# Patient Record
Sex: FEMALE | Race: WHITE | Employment: FULL TIME | URBAN - METROPOLITAN AREA
[De-identification: names, ages, dates, MRNs, and addresses within clinical notes are randomized per-mention and may not be internally consistent; named-entity substitution may affect disease eponyms.]

---

## 2017-06-20 ENCOUNTER — HOSPITAL ENCOUNTER (OUTPATIENT)
Dept: GENERAL RADIOLOGY | Age: 37
Discharge: HOME | End: 2017-06-20
Attending: INTERNAL MEDICINE
Payer: COMMERCIAL

## 2017-06-20 ENCOUNTER — OFFICE VISIT (OUTPATIENT)
Dept: INTERNAL MEDICINE CLINIC | Facility: CLINIC | Age: 37
End: 2017-06-20

## 2017-06-20 VITALS
WEIGHT: 135 LBS | BODY MASS INDEX: 22 KG/M2 | HEART RATE: 64 BPM | DIASTOLIC BLOOD PRESSURE: 65 MMHG | SYSTOLIC BLOOD PRESSURE: 116 MMHG

## 2017-06-20 DIAGNOSIS — R53.83 OTHER FATIGUE: Primary | ICD-10-CM

## 2017-06-20 DIAGNOSIS — E78.5 HYPERLIPIDEMIA, UNSPECIFIED HYPERLIPIDEMIA TYPE: ICD-10-CM

## 2017-06-20 DIAGNOSIS — R53.83 OTHER FATIGUE: ICD-10-CM

## 2017-06-20 DIAGNOSIS — R23.3 SPONTANEOUS ECCHYMOSES: ICD-10-CM

## 2017-06-20 PROCEDURE — 71020 XR CHEST PA + LAT CHEST (CPT=71020): CPT | Performed by: INTERNAL MEDICINE

## 2017-06-20 PROCEDURE — 99214 OFFICE O/P EST MOD 30 MIN: CPT | Performed by: INTERNAL MEDICINE

## 2017-06-20 PROCEDURE — 99212 OFFICE O/P EST SF 10 MIN: CPT | Performed by: INTERNAL MEDICINE

## 2017-06-20 RX ORDER — GARLIC EXTRACT 500 MG
1 CAPSULE ORAL DAILY
COMMUNITY
End: 2019-03-04

## 2017-06-20 NOTE — PROGRESS NOTES
HPI:    Patient ID: Nelsy Sandy is a 44year old female.     HPI  Patient patient states that she feels fatigued, he has to push herself to do regular activities, she does have busy life, works 3 jobs, his children, she is single mother,  Constantly o Allergies:  Dust                        Comment:Eyes swell   /65   Pulse 64   Wt 135 lb (61.2 kg)   BMI 21.79 kg/m²     Physical Exam    Constitutional: She is oriented to person, place, and time. She appears well-developed and well-nourished.  No [E]  Prothrombin Time (PT) [E]  PTT, Activated [E]    Meds This Visit:  No prescriptions requested or ordered in this encounter       Imaging & Referrals:  CT ABDOMEN + PELVIS(CONTRAST ONLY) (JE[facility]=[id number])         #[id number]

## 2017-06-30 ENCOUNTER — HOSPITAL ENCOUNTER (OUTPATIENT)
Dept: CT IMAGING | Age: 37
Discharge: HOME | End: 2017-06-30
Attending: INTERNAL MEDICINE
Payer: COMMERCIAL

## 2017-06-30 ENCOUNTER — LAB ENCOUNTER (OUTPATIENT)
Dept: LAB | Age: 37
End: 2017-06-30
Attending: INTERNAL MEDICINE
Payer: COMMERCIAL

## 2017-06-30 DIAGNOSIS — R53.83 OTHER FATIGUE: ICD-10-CM

## 2017-06-30 DIAGNOSIS — R23.3 SPONTANEOUS ECCHYMOSES: ICD-10-CM

## 2017-06-30 LAB
ALBUMIN SERPL BCP-MCNC: 3.8 G/DL (ref 3.5–4.8)
ALBUMIN/GLOB SERPL: 1.5 {RATIO} (ref 1–2)
ALP SERPL-CCNC: 60 U/L (ref 32–100)
ALT SERPL-CCNC: 15 U/L (ref 14–54)
ANION GAP SERPL CALC-SCNC: 7 MMOL/L (ref 0–18)
APTT PPP: 31.2 SECONDS (ref 23.2–35.3)
AST SERPL-CCNC: 22 U/L (ref 15–41)
BASOPHILS # BLD: 0 K/UL (ref 0–0.2)
BASOPHILS NFR BLD: 0 %
BILIRUB SERPL-MCNC: 0.8 MG/DL (ref 0.3–1.2)
BUN SERPL-MCNC: 5 MG/DL (ref 8–20)
BUN/CREAT SERPL: 6.8 (ref 10–20)
CALCIUM SERPL-MCNC: 9.4 MG/DL (ref 8.5–10.5)
CHLORIDE SERPL-SCNC: 106 MMOL/L (ref 95–110)
CHOLEST SERPL-MCNC: 228 MG/DL (ref 110–200)
CO2 SERPL-SCNC: 25 MMOL/L (ref 22–32)
CREAT BLD-MCNC: 0.6 MG/DL (ref 0.5–1.5)
CREAT SERPL-MCNC: 0.73 MG/DL (ref 0.5–1.5)
CRP SERPL-MCNC: <0.5 MG/DL (ref 0–0.9)
EOSINOPHIL # BLD: 0.1 K/UL (ref 0–0.7)
EOSINOPHIL NFR BLD: 1 %
ERYTHROCYTE [DISTWIDTH] IN BLOOD BY AUTOMATED COUNT: 13.7 % (ref 11–15)
ERYTHROCYTE [SEDIMENTATION RATE] IN BLOOD: 6 MM/HR (ref 0–20)
GLOBULIN PLAS-MCNC: 2.5 G/DL (ref 2.5–3.7)
GLUCOSE SERPL-MCNC: 86 MG/DL (ref 70–99)
HCT VFR BLD AUTO: 39.4 % (ref 35–48)
HDLC SERPL-MCNC: 75 MG/DL
HGB BLD-MCNC: 13.1 G/DL (ref 12–16)
INR BLD: 1 (ref 0.9–1.2)
LDLC SERPL CALC-MCNC: 135 MG/DL (ref 0–99)
LYMPHOCYTES # BLD: 1.9 K/UL (ref 1–4)
LYMPHOCYTES NFR BLD: 32 %
MAGNESIUM SERPL-MCNC: 1.8 MG/DL (ref 1.8–2.5)
MCH RBC QN AUTO: 28.7 PG (ref 27–32)
MCHC RBC AUTO-ENTMCNC: 33.3 G/DL (ref 32–37)
MCV RBC AUTO: 86.2 FL (ref 80–100)
MONOCYTES # BLD: 0.4 K/UL (ref 0–1)
MONOCYTES NFR BLD: 7 %
NEUTROPHILS # BLD AUTO: 3.5 K/UL (ref 1.8–7.7)
NEUTROPHILS NFR BLD: 60 %
NONHDLC SERPL-MCNC: 153 MG/DL
OSMOLALITY UR CALC.SUM OF ELEC: 283 MOSM/KG (ref 275–295)
PLATELET # BLD AUTO: 234 K/UL (ref 140–400)
PMV BLD AUTO: 9.2 FL (ref 7.4–10.3)
POTASSIUM SERPL-SCNC: 4.5 MMOL/L (ref 3.3–5.1)
PROT SERPL-MCNC: 6.3 G/DL (ref 5.9–8.4)
PROTHROMBIN TIME: 12.5 SECONDS (ref 11.8–14.5)
RBC # BLD AUTO: 4.57 M/UL (ref 3.7–5.4)
SODIUM SERPL-SCNC: 138 MMOL/L (ref 136–144)
TRIGL SERPL-MCNC: 90 MG/DL (ref 1–149)
TSH SERPL-ACNC: 1.05 UIU/ML (ref 0.45–5.33)
WBC # BLD AUTO: 5.9 K/UL (ref 4–11)

## 2017-06-30 PROCEDURE — 85610 PROTHROMBIN TIME: CPT

## 2017-06-30 PROCEDURE — 83735 ASSAY OF MAGNESIUM: CPT

## 2017-06-30 PROCEDURE — 80053 COMPREHEN METABOLIC PANEL: CPT

## 2017-06-30 PROCEDURE — 85730 THROMBOPLASTIN TIME PARTIAL: CPT

## 2017-06-30 PROCEDURE — 82306 VITAMIN D 25 HYDROXY: CPT

## 2017-06-30 PROCEDURE — 86140 C-REACTIVE PROTEIN: CPT

## 2017-06-30 PROCEDURE — 80061 LIPID PANEL: CPT

## 2017-06-30 PROCEDURE — 84443 ASSAY THYROID STIM HORMONE: CPT

## 2017-06-30 PROCEDURE — 85025 COMPLETE CBC W/AUTO DIFF WBC: CPT

## 2017-06-30 PROCEDURE — 85652 RBC SED RATE AUTOMATED: CPT

## 2017-06-30 PROCEDURE — 82565 ASSAY OF CREATININE: CPT

## 2017-06-30 PROCEDURE — 36415 COLL VENOUS BLD VENIPUNCTURE: CPT

## 2017-06-30 PROCEDURE — 74177 CT ABD & PELVIS W/CONTRAST: CPT | Performed by: INTERNAL MEDICINE

## 2017-07-03 LAB — 25(OH)D3 SERPL-MCNC: 39.6 NG/ML

## 2017-07-07 NOTE — ADDENDUM NOTE
Encounter addended by:  Mack Augustin LPN on: 9/0/1409  4:84 AM<BR>    Actions taken: Letter status changed

## 2017-10-02 ENCOUNTER — HOSPITAL ENCOUNTER (OUTPATIENT)
Age: 37
Discharge: HOME | End: 2017-10-02
Payer: COMMERCIAL

## 2017-10-02 VITALS
OXYGEN SATURATION: 100 % | TEMPERATURE: 99 F | DIASTOLIC BLOOD PRESSURE: 64 MMHG | RESPIRATION RATE: 16 BRPM | WEIGHT: 135 LBS | SYSTOLIC BLOOD PRESSURE: 106 MMHG | HEIGHT: 66 IN | HEART RATE: 81 BPM | BODY MASS INDEX: 21.69 KG/M2

## 2017-10-02 DIAGNOSIS — J02.9 VIRAL PHARYNGITIS: Primary | ICD-10-CM

## 2017-10-02 PROCEDURE — 99204 OFFICE O/P NEW MOD 45 MIN: CPT

## 2017-10-02 PROCEDURE — 87430 STREP A AG IA: CPT

## 2017-10-02 PROCEDURE — 99213 OFFICE O/P EST LOW 20 MIN: CPT

## 2017-10-02 RX ORDER — AMOXICILLIN 500 MG/1
500 TABLET, FILM COATED ORAL 2 TIMES DAILY
Qty: 20 TABLET | Refills: 0 | Status: SHIPPED | OUTPATIENT
Start: 2017-10-02 | End: 2017-10-12

## 2017-10-03 NOTE — ED INITIAL ASSESSMENT (HPI)
Pt with pain in right ear, swollen area on right side of neck and sore throat. Pt states she thinks she was bit by a spider. States pain started on Friday.  Denies fever

## 2017-10-03 NOTE — ED PROVIDER NOTES
Patient presents with:  Ear Pain  Sore Throat      HPI:     Tyrone Parmar is a 44year old female who presents for evaluation of a chief complaint of sore throat. Patient reports she was bit by a spider to the right side of her neck 2 days ago.   Today throat for the last 2 days. Patient reports at that time she also believes she was bit by a spider to the right side of her neck. There is a pop small pea-sized area of redness to the right side of her neck consistent with a bite.   There is no streaking,

## 2017-10-07 ENCOUNTER — HOSPITAL ENCOUNTER (EMERGENCY)
Facility: HOSPITAL | Age: 37
Discharge: HOME | End: 2017-10-07
Attending: EMERGENCY MEDICINE
Payer: COMMERCIAL

## 2017-10-07 VITALS
HEIGHT: 66 IN | BODY MASS INDEX: 21.69 KG/M2 | OXYGEN SATURATION: 100 % | DIASTOLIC BLOOD PRESSURE: 81 MMHG | SYSTOLIC BLOOD PRESSURE: 125 MMHG | RESPIRATION RATE: 16 BRPM | HEART RATE: 65 BPM | TEMPERATURE: 98 F | WEIGHT: 135 LBS

## 2017-10-07 DIAGNOSIS — N93.8 DYSFUNCTIONAL UTERINE BLEEDING: Primary | ICD-10-CM

## 2017-10-07 PROCEDURE — 80048 BASIC METABOLIC PNL TOTAL CA: CPT | Performed by: EMERGENCY MEDICINE

## 2017-10-07 PROCEDURE — 81001 URINALYSIS AUTO W/SCOPE: CPT | Performed by: EMERGENCY MEDICINE

## 2017-10-07 PROCEDURE — 85025 COMPLETE CBC W/AUTO DIFF WBC: CPT | Performed by: EMERGENCY MEDICINE

## 2017-10-07 PROCEDURE — 36415 COLL VENOUS BLD VENIPUNCTURE: CPT

## 2017-10-07 PROCEDURE — 99283 EMERGENCY DEPT VISIT LOW MDM: CPT

## 2017-10-07 RX ORDER — TRAMADOL HYDROCHLORIDE 50 MG/1
50 TABLET ORAL ONCE
Status: COMPLETED | OUTPATIENT
Start: 2017-10-07 | End: 2017-10-07

## 2017-10-07 RX ORDER — IBUPROFEN 600 MG/1
600 TABLET ORAL ONCE
Status: COMPLETED | OUTPATIENT
Start: 2017-10-07 | End: 2017-10-07

## 2017-10-07 NOTE — ED INITIAL ASSESSMENT (HPI)
C/o heavy vaginal bleeding with clots since yesterday. Unknown if pregnant d/t irregular periods. Pt c/o lower abd pain, mostly on the left. Pt denies fever, c/o n/v x 2 this morning. Pt also being treated for Strep throat with amox, started on Monday.  Pt

## 2017-10-07 NOTE — ED PROVIDER NOTES
Patient Seen in: [facility] AND [facility] Emergency Department    History   Patient presents with:  Vaginal Bleeding    Stated Complaint: bleeding    HPI    Patient complains of vaginal bleeding that began yesterday. Hx of irregular periods.   Today crampy lowe Oral  SpO2: 99 %  O2 Device: None (Room air)    Current:/81   Pulse 65   Temp 97.8 °F (36.6 °C) (Oral)   Resp 16   Ht 167.6 cm (5' 6\")   Wt 61.2 kg   LMP 09/07/2017   SpO2 100%   BMI 21.79 kg/m²     PULSE OX Nl on room air    GENERAL: awake alert no

## 2018-01-01 ENCOUNTER — HOSPITAL ENCOUNTER (EMERGENCY)
Facility: HOSPITAL | Age: 38
Discharge: HOME | End: 2018-01-02
Attending: EMERGENCY MEDICINE
Payer: COMMERCIAL

## 2018-01-01 DIAGNOSIS — R07.9 CHEST PAIN OF UNCERTAIN ETIOLOGY: Primary | ICD-10-CM

## 2018-01-01 PROCEDURE — 36415 COLL VENOUS BLD VENIPUNCTURE: CPT

## 2018-01-01 PROCEDURE — 93005 ELECTROCARDIOGRAM TRACING: CPT

## 2018-01-01 PROCEDURE — 93010 ELECTROCARDIOGRAM REPORT: CPT | Performed by: EMERGENCY MEDICINE

## 2018-01-01 PROCEDURE — 99285 EMERGENCY DEPT VISIT HI MDM: CPT

## 2018-01-01 RX ORDER — MAGNESIUM HYDROXIDE/ALUMINUM HYDROXICE/SIMETHICONE 120; 1200; 1200 MG/30ML; MG/30ML; MG/30ML
30 SUSPENSION ORAL ONCE
Status: COMPLETED | OUTPATIENT
Start: 2018-01-01 | End: 2018-01-02

## 2018-01-01 RX ORDER — ASPIRIN 81 MG/1
324 TABLET, CHEWABLE ORAL ONCE
Status: COMPLETED | OUTPATIENT
Start: 2018-01-01 | End: 2018-01-02

## 2018-01-01 RX ORDER — DICYCLOMINE HYDROCHLORIDE 10 MG/5ML
20 SOLUTION ORAL ONCE
Status: COMPLETED | OUTPATIENT
Start: 2018-01-01 | End: 2018-01-02

## 2018-01-02 ENCOUNTER — APPOINTMENT (OUTPATIENT)
Dept: GENERAL RADIOLOGY | Facility: HOSPITAL | Age: 38
End: 2018-01-02
Attending: EMERGENCY MEDICINE
Payer: COMMERCIAL

## 2018-01-02 VITALS
HEIGHT: 66 IN | WEIGHT: 135 LBS | BODY MASS INDEX: 21.69 KG/M2 | OXYGEN SATURATION: 100 % | SYSTOLIC BLOOD PRESSURE: 113 MMHG | HEART RATE: 52 BPM | DIASTOLIC BLOOD PRESSURE: 68 MMHG | RESPIRATION RATE: 16 BRPM

## 2018-01-02 LAB
ANION GAP SERPL CALC-SCNC: 9 MMOL/L (ref 0–18)
BASOPHILS # BLD: 0 K/UL (ref 0–0.2)
BASOPHILS NFR BLD: 1 %
BUN SERPL-MCNC: 5 MG/DL (ref 8–20)
BUN/CREAT SERPL: 6.9 (ref 10–20)
CALCIUM SERPL-MCNC: 9.6 MG/DL (ref 8.5–10.5)
CHLORIDE SERPL-SCNC: 103 MMOL/L (ref 95–110)
CO2 SERPL-SCNC: 25 MMOL/L (ref 22–32)
CREAT SERPL-MCNC: 0.72 MG/DL (ref 0.5–1.5)
EOSINOPHIL # BLD: 0.1 K/UL (ref 0–0.7)
EOSINOPHIL NFR BLD: 1 %
ERYTHROCYTE [DISTWIDTH] IN BLOOD BY AUTOMATED COUNT: 14.5 % (ref 11–15)
GLUCOSE SERPL-MCNC: 86 MG/DL (ref 70–99)
HCT VFR BLD AUTO: 43.1 % (ref 35–48)
HGB BLD-MCNC: 14.3 G/DL (ref 12–16)
LYMPHOCYTES # BLD: 2.5 K/UL (ref 1–4)
LYMPHOCYTES NFR BLD: 33 %
MCH RBC QN AUTO: 28.3 PG (ref 27–32)
MCHC RBC AUTO-ENTMCNC: 33.2 G/DL (ref 32–37)
MCV RBC AUTO: 85.1 FL (ref 80–100)
MONOCYTES # BLD: 0.6 K/UL (ref 0–1)
MONOCYTES NFR BLD: 7 %
NEUTROPHILS # BLD AUTO: 4.3 K/UL (ref 1.8–7.7)
NEUTROPHILS NFR BLD: 57 %
OSMOLALITY UR CALC.SUM OF ELEC: 281 MOSM/KG (ref 275–295)
PLATELET # BLD AUTO: 331 K/UL (ref 140–400)
PMV BLD AUTO: 8.7 FL (ref 7.4–10.3)
POTASSIUM SERPL-SCNC: 3.9 MMOL/L (ref 3.3–5.1)
RBC # BLD AUTO: 5.06 M/UL (ref 3.7–5.4)
SODIUM SERPL-SCNC: 137 MMOL/L (ref 136–144)
TROPONIN I SERPL-MCNC: 0.01 NG/ML (ref ?–0.03)
WBC # BLD AUTO: 7.5 K/UL (ref 4–11)

## 2018-01-02 PROCEDURE — 85025 COMPLETE CBC W/AUTO DIFF WBC: CPT | Performed by: EMERGENCY MEDICINE

## 2018-01-02 PROCEDURE — 80048 BASIC METABOLIC PNL TOTAL CA: CPT | Performed by: EMERGENCY MEDICINE

## 2018-01-02 PROCEDURE — 84484 ASSAY OF TROPONIN QUANT: CPT | Performed by: EMERGENCY MEDICINE

## 2018-01-02 PROCEDURE — 71046 X-RAY EXAM CHEST 2 VIEWS: CPT | Performed by: EMERGENCY MEDICINE

## 2018-01-02 NOTE — ED NOTES
Patient arrives with complaints of chest pain that radiates to left shoulder that \"woke her up this morning\". Patient states she vomited twice, and has been very nauseous. Denies SOB. Denies blurred vision or dizziness.  Per pt, has family history of hear

## 2018-01-02 NOTE — ED PROVIDER NOTES
Patient Seen in: [facility] AND [facility] Emergency Department    History   Patient presents with:  Chest Pain Angina (cardiovascular)    Stated Complaint: chest pain     HPI    45 yo female with chest pain that started this morning. She awoke with the pain.  It light.   Neck: Normal range of motion. Neck supple. Cardiovascular: Normal rate, regular rhythm, normal heart sounds and intact distal pulses. Pulmonary/Chest: Effort normal and breath sounds normal.   Abdominal: Soft.  Bowel sounds are normal. She exh unremarkable. The lungs are clear. No evidence for pneumonia or pulmonary edema. No pleural effusion or pneumothorax. Osseous structures do not demonstrate any displaced fractures.   Surgical clips in the right upper quadrant        Labs reviewed an

## 2018-02-12 ENCOUNTER — HOSPITAL ENCOUNTER (OUTPATIENT)
Age: 38
Discharge: HOME | End: 2018-02-12
Payer: COMMERCIAL

## 2018-02-12 VITALS
RESPIRATION RATE: 18 BRPM | WEIGHT: 135 LBS | TEMPERATURE: 98 F | DIASTOLIC BLOOD PRESSURE: 64 MMHG | OXYGEN SATURATION: 100 % | SYSTOLIC BLOOD PRESSURE: 109 MMHG | HEIGHT: 66 IN | HEART RATE: 67 BPM | BODY MASS INDEX: 21.69 KG/M2

## 2018-02-12 DIAGNOSIS — J02.0 STREPTOCOCCAL SORE THROAT: Primary | ICD-10-CM

## 2018-02-12 LAB
B-HCG UR QL: NEGATIVE
S PYO AG THROAT QL: POSITIVE
URINE BILIRUBIN: NEGATIVE
URINE BLOOD: NEGATIVE
URINE CLARITY: CLEAR
URINE COLOR: YELLOW
URINE GLUCOSE: NEGATIVE MG/DL
URINE KETONES: NEGATIVE MG/DL
URINE LEUKOCYTE ESTERASE: NEGATIVE
URINE NITRITE: NEGATIVE
URINE PH: 7
URINE PROTEIN: NEGATIVE MG /DL
URINE SPECIFIC GRAVITY: 1.01
URINE UROBILINOGEN: 0.2 MG/DL

## 2018-02-12 PROCEDURE — 99214 OFFICE O/P EST MOD 30 MIN: CPT

## 2018-02-12 PROCEDURE — 99213 OFFICE O/P EST LOW 20 MIN: CPT

## 2018-02-12 PROCEDURE — 81025 URINE PREGNANCY TEST: CPT

## 2018-02-12 PROCEDURE — 87430 STREP A AG IA: CPT

## 2018-02-12 PROCEDURE — 81002 URINALYSIS NONAUTO W/O SCOPE: CPT

## 2018-02-12 RX ORDER — FLUCONAZOLE 150 MG/1
150 TABLET ORAL ONCE
Qty: 1 TABLET | Refills: 0 | Status: SHIPPED | OUTPATIENT
Start: 2018-02-12 | End: 2018-02-12

## 2018-02-12 RX ORDER — AMOXICILLIN 875 MG/1
875 TABLET, COATED ORAL 2 TIMES DAILY
Qty: 20 TABLET | Refills: 0 | Status: SHIPPED | OUTPATIENT
Start: 2018-02-12 | End: 2018-02-22

## 2018-02-13 NOTE — ED INITIAL ASSESSMENT (HPI)
PATIENT ARRIVED AMBULATORY TO ROOM WITH FAMILY C/O A CONGESTED COUGH AND NASAL CONGESTION STARTED YESTERDAY. +BODY ACHES. +FATIGUE. NO N/V/D. PT DENIES TAKING TEMPERATURES. EASY NON LABORED RESPIRATIONS.

## 2018-02-13 NOTE — ED PROVIDER NOTES
Patient presents with:  Cough/URI      HPI:     Ella Salinas is a 44year old female who presents for evaluation of a chief complaint of sore throat, right-sided back discomfort, sneezing, and some nasal congestion that started last night.   No history turgor, no obvious rashes  NECK: supple, no adenopathy, no thyromegaly. No meningismus. CARDIO: RRR without murmur  EXTREMITIES: no cyanosis, clubbing or edema  GI: soft, non-tender, normal bowel sounds. No distention. No masses palpated.  Hx of cholecyste 1. 030   Blood, Urine Negative Negative   PH, Urine 7.0 5.0 - 8.0   Protein urine Negative Negative mg /dL   Urobilinogen urine 0.2 <2.0 mg/dL   Nitrite Urine Negative Negative   Leukocyte esterase urine Negative Negative       MDM:  The urine dip and urine

## 2018-06-01 ENCOUNTER — HOSPITAL ENCOUNTER (EMERGENCY)
Facility: HOSPITAL | Age: 38
Discharge: HOME | End: 2018-06-01
Attending: PHYSICIAN ASSISTANT
Payer: COMMERCIAL

## 2018-06-01 ENCOUNTER — APPOINTMENT (OUTPATIENT)
Dept: CT IMAGING | Facility: HOSPITAL | Age: 38
End: 2018-06-01
Attending: PHYSICIAN ASSISTANT
Payer: COMMERCIAL

## 2018-06-01 VITALS
HEART RATE: 56 BPM | DIASTOLIC BLOOD PRESSURE: 64 MMHG | HEIGHT: 66 IN | OXYGEN SATURATION: 98 % | TEMPERATURE: 99 F | WEIGHT: 135 LBS | RESPIRATION RATE: 16 BRPM | SYSTOLIC BLOOD PRESSURE: 102 MMHG | BODY MASS INDEX: 21.69 KG/M2

## 2018-06-01 DIAGNOSIS — K52.9 ENTERITIS: ICD-10-CM

## 2018-06-01 DIAGNOSIS — R10.10 UPPER ABDOMINAL PAIN: Primary | ICD-10-CM

## 2018-06-01 PROCEDURE — 85025 COMPLETE CBC W/AUTO DIFF WBC: CPT | Performed by: PHYSICIAN ASSISTANT

## 2018-06-01 PROCEDURE — 96374 THER/PROPH/DIAG INJ IV PUSH: CPT

## 2018-06-01 PROCEDURE — 81001 URINALYSIS AUTO W/SCOPE: CPT | Performed by: PHYSICIAN ASSISTANT

## 2018-06-01 PROCEDURE — 80053 COMPREHEN METABOLIC PANEL: CPT | Performed by: PHYSICIAN ASSISTANT

## 2018-06-01 PROCEDURE — 83690 ASSAY OF LIPASE: CPT | Performed by: PHYSICIAN ASSISTANT

## 2018-06-01 PROCEDURE — 81025 URINE PREGNANCY TEST: CPT

## 2018-06-01 PROCEDURE — 96375 TX/PRO/DX INJ NEW DRUG ADDON: CPT

## 2018-06-01 PROCEDURE — 74176 CT ABD & PELVIS W/O CONTRAST: CPT | Performed by: PHYSICIAN ASSISTANT

## 2018-06-01 PROCEDURE — 99284 EMERGENCY DEPT VISIT MOD MDM: CPT

## 2018-06-01 RX ORDER — FAMOTIDINE 20 MG/1
20 TABLET ORAL 2 TIMES DAILY
Qty: 28 TABLET | Refills: 0 | Status: SHIPPED | OUTPATIENT
Start: 2018-06-01 | End: 2018-06-15

## 2018-06-01 RX ORDER — MORPHINE SULFATE 4 MG/ML
4 INJECTION, SOLUTION INTRAMUSCULAR; INTRAVENOUS ONCE
Status: COMPLETED | OUTPATIENT
Start: 2018-06-01 | End: 2018-06-01

## 2018-06-01 RX ORDER — ONDANSETRON 2 MG/ML
4 INJECTION INTRAMUSCULAR; INTRAVENOUS ONCE
Status: COMPLETED | OUTPATIENT
Start: 2018-06-01 | End: 2018-06-01

## 2018-06-01 RX ORDER — ONDANSETRON 4 MG/1
4 TABLET, ORALLY DISINTEGRATING ORAL EVERY 6 HOURS PRN
Qty: 10 TABLET | Refills: 0 | Status: SHIPPED | OUTPATIENT
Start: 2018-06-01 | End: 2018-06-04

## 2018-06-01 NOTE — ED INITIAL ASSESSMENT (HPI)
Pt came in for mid back pain bilat that radiates to front of her abdomen for one day with nausea and diarrhea. Reports similar to her gallbladder pain prior to [place] but more so on the LUQ. Denies vomiting, fever, CP/SOB, cough, dysuria/hematuria.  RR even

## 2018-06-01 NOTE — ED PROVIDER NOTES
Patient Seen in: [facility] AND [facility] Emergency Department    History   Patient presents with:  Back Pain (musculoskeletal)    Stated Complaint: Upper back & midepigastric pain since this AM    HPI    Lux Castellano is a 43year old female who presents w this AM  Other systems are as noted in HPI. Constitutional and vital signs reviewed. All other systems reviewed and negative except as noted above. PSFH elements reviewed from today and agreed except as otherwise stated in HPI.     Physical Exam inspection. Warm and dry. No obvious bruising. No obvious rash.         ED Course     Labs Reviewed   URINALYSIS WITH CULTURE REFLEX - Abnormal; Notable for the following:        Result Value    Blood Urine Small (*)     Bacteria Urine Few (*)     All ot reexaminations as previously documented. Results reviewed and need for follow-up discussed with patient. Patient case discussed with and patient seen by Dr. Xiang Olivera.     Disposition and Plan     Clinical Impression:  Upper abdominal pain  (primary encounte

## 2018-06-01 NOTE — ED NOTES
Pt states having burning epigastric pain, with mid back pain, nausea. States started approx 1pm.  feels similar to her gallblader, which was removed approx 5 yrs ago. Denies vomiting, fevers, chest pain, alcohol use.   is using several supplemen

## 2019-01-03 ENCOUNTER — OFFICE VISIT (OUTPATIENT)
Dept: INTERNAL MEDICINE CLINIC | Facility: CLINIC | Age: 39
End: 2019-01-03

## 2019-01-03 ENCOUNTER — LAB ENCOUNTER (OUTPATIENT)
Dept: LAB | Age: 39
End: 2019-01-03
Attending: INTERNAL MEDICINE
Payer: COMMERCIAL

## 2019-01-03 VITALS
HEART RATE: 72 BPM | TEMPERATURE: 99 F | DIASTOLIC BLOOD PRESSURE: 70 MMHG | BODY MASS INDEX: 23 KG/M2 | WEIGHT: 145.5 LBS | SYSTOLIC BLOOD PRESSURE: 110 MMHG

## 2019-01-03 DIAGNOSIS — R19.7 DIARRHEA, UNSPECIFIED TYPE: Primary | ICD-10-CM

## 2019-01-03 DIAGNOSIS — R19.7 DIARRHEA, UNSPECIFIED TYPE: ICD-10-CM

## 2019-01-03 LAB
ALBUMIN SERPL BCP-MCNC: 4.5 G/DL (ref 3.5–4.8)
ALBUMIN/GLOB SERPL: 1.5 {RATIO} (ref 1–2)
ALP SERPL-CCNC: 83 U/L (ref 32–100)
ALT SERPL-CCNC: 16 U/L (ref 14–54)
ANION GAP SERPL CALC-SCNC: 11 MMOL/L (ref 0–18)
AST SERPL-CCNC: 23 U/L (ref 15–41)
BASOPHILS # BLD: 0 K/UL (ref 0–0.2)
BASOPHILS NFR BLD: 1 %
BILIRUB SERPL-MCNC: 0.6 MG/DL (ref 0.3–1.2)
BUN SERPL-MCNC: 6 MG/DL (ref 8–20)
BUN/CREAT SERPL: 7.6 (ref 10–20)
CALCIUM SERPL-MCNC: 10.1 MG/DL (ref 8.5–10.5)
CHLORIDE SERPL-SCNC: 105 MMOL/L (ref 95–110)
CO2 SERPL-SCNC: 22 MMOL/L (ref 22–32)
CREAT SERPL-MCNC: 0.79 MG/DL (ref 0.5–1.5)
EOSINOPHIL # BLD: 0.1 K/UL (ref 0–0.7)
EOSINOPHIL NFR BLD: 1 %
ERYTHROCYTE [DISTWIDTH] IN BLOOD BY AUTOMATED COUNT: 14.6 % (ref 11–15)
GLOBULIN PLAS-MCNC: 3.1 G/DL (ref 2.5–3.7)
GLUCOSE SERPL-MCNC: 95 MG/DL (ref 70–99)
HCT VFR BLD AUTO: 44.9 % (ref 35–48)
HGB BLD-MCNC: 14.7 G/DL (ref 12–16)
LYMPHOCYTES # BLD: 2.1 K/UL (ref 1–4)
LYMPHOCYTES NFR BLD: 23 %
MCH RBC QN AUTO: 27.7 PG (ref 27–32)
MCHC RBC AUTO-ENTMCNC: 32.6 G/DL (ref 32–37)
MCV RBC AUTO: 84.8 FL (ref 80–100)
MONOCYTES # BLD: 0.6 K/UL (ref 0–1)
MONOCYTES NFR BLD: 7 %
NEUTROPHILS # BLD AUTO: 6.4 K/UL (ref 1.8–7.7)
NEUTROPHILS NFR BLD: 69 %
OSMOLALITY UR CALC.SUM OF ELEC: 283 MOSM/KG (ref 275–295)
PATIENT FASTING: NO
PLATELET # BLD AUTO: 243 K/UL (ref 140–400)
PMV BLD AUTO: 9.5 FL (ref 7.4–10.3)
POTASSIUM SERPL-SCNC: 5.5 MMOL/L (ref 3.3–5.1)
PROT SERPL-MCNC: 7.6 G/DL (ref 5.9–8.4)
RBC # BLD AUTO: 5.3 M/UL (ref 3.7–5.4)
SODIUM SERPL-SCNC: 138 MMOL/L (ref 136–144)
TSH SERPL-ACNC: 1.02 UIU/ML (ref 0.45–5.33)
WBC # BLD AUTO: 9.2 K/UL (ref 4–11)

## 2019-01-03 PROCEDURE — 85025 COMPLETE CBC W/AUTO DIFF WBC: CPT

## 2019-01-03 PROCEDURE — 84443 ASSAY THYROID STIM HORMONE: CPT

## 2019-01-03 PROCEDURE — 99212 OFFICE O/P EST SF 10 MIN: CPT | Performed by: INTERNAL MEDICINE

## 2019-01-03 PROCEDURE — 99214 OFFICE O/P EST MOD 30 MIN: CPT | Performed by: INTERNAL MEDICINE

## 2019-01-03 PROCEDURE — 80053 COMPREHEN METABOLIC PANEL: CPT

## 2019-01-03 PROCEDURE — 36415 COLL VENOUS BLD VENIPUNCTURE: CPT

## 2019-01-04 ENCOUNTER — LAB ENCOUNTER (OUTPATIENT)
Dept: LAB | Age: 39
End: 2019-01-04
Attending: INTERNAL MEDICINE
Payer: COMMERCIAL

## 2019-01-04 DIAGNOSIS — R19.7 DIARRHEA, UNSPECIFIED TYPE: ICD-10-CM

## 2019-01-04 LAB — C DIFF TOX B STL QL: NEGATIVE

## 2019-01-04 PROCEDURE — 87046 STOOL CULTR AEROBIC BACT EA: CPT

## 2019-01-04 PROCEDURE — 87493 C DIFF AMPLIFIED PROBE: CPT

## 2019-01-04 PROCEDURE — 87427 SHIGA-LIKE TOXIN AG IA: CPT

## 2019-01-04 PROCEDURE — 87045 FECES CULTURE AEROBIC BACT: CPT

## 2019-01-07 ENCOUNTER — TELEPHONE (OUTPATIENT)
Dept: INTERNAL MEDICINE CLINIC | Facility: CLINIC | Age: 39
End: 2019-01-07

## 2019-01-07 DIAGNOSIS — R19.7 DIARRHEA, UNSPECIFIED TYPE: Primary | ICD-10-CM

## 2019-01-07 NOTE — TELEPHONE ENCOUNTER
Phone call from patient for lab results. Related Dr. Hadley Lewis lab results message to patient that stool negative for C-Diff and labs, \"ok\". Patient states,\"ok\". Patient states she still having diarrhea stool in the AM only.  States she thinks condition is r

## 2019-01-08 NOTE — TELEPHONE ENCOUNTER
----- Message from Edith Acosta MD sent at 1/6/2019  8:07 PM CST -----  Stools all normal how ix she doing?     Notes recorded by Mukesh Mcallister MD on 1/6/2019 at 8:00 PM CST  Labs good

## 2019-01-12 NOTE — PROGRESS NOTES
HPI:    Patient ID: Shena Craig is a 43year old female. Patient presents with:  Diarrhea: c/o abdominal growling. Rash    Diarrhea    Pertinent negatives include no chills, fever or vomiting.    Pt c/o diarrhea with associated \"growling sounds\" mouth daily. Disp:  Rfl:    VITAMIN D-VITAMIN K OR Take 1 tablet by mouth daily. Disp:  Rfl:    Magnesium 500 MG Oral Tab Take  by mouth. Take 1 tablet daily Disp:  Rfl:    BIOTIN OR Take  by mouth.  Disp:  Rfl:    Multiple Vitamin (MULTI-VITAMIN) Oral Tab Lab Results   Component Value Date    TRIG 90 06/30/2017     Lab Results   Component Value Date    AST 23 01/03/2019    AST 19 06/01/2018    AST 22 06/30/2017     Lab Results   Component Value Date    ALT 16 01/03/2019    ALT 12 (L) 06/01/2018    ALT 1

## 2019-01-19 NOTE — TELEPHONE ENCOUNTER
Pt informed of lab result & MD message. Pt stated she feels the same, still has  diarrhea 2-3 x per day, sometime soft or liquid stool. .   pls advise, thanks in advance.

## 2019-02-24 ENCOUNTER — APPOINTMENT (OUTPATIENT)
Dept: ULTRASOUND IMAGING | Facility: HOSPITAL | Age: 39
End: 2019-02-24
Attending: EMERGENCY MEDICINE
Payer: COMMERCIAL

## 2019-02-24 ENCOUNTER — HOSPITAL ENCOUNTER (EMERGENCY)
Facility: HOSPITAL | Age: 39
Discharge: HOME | End: 2019-02-24
Attending: EMERGENCY MEDICINE
Payer: COMMERCIAL

## 2019-02-24 VITALS
WEIGHT: 135 LBS | OXYGEN SATURATION: 100 % | SYSTOLIC BLOOD PRESSURE: 125 MMHG | RESPIRATION RATE: 16 BRPM | HEIGHT: 66 IN | HEART RATE: 60 BPM | TEMPERATURE: 98 F | DIASTOLIC BLOOD PRESSURE: 88 MMHG | BODY MASS INDEX: 21.69 KG/M2

## 2019-02-24 DIAGNOSIS — N93.8 DYSFUNCTIONAL UTERINE BLEEDING: Primary | ICD-10-CM

## 2019-02-24 DIAGNOSIS — N83.202 CYST OF LEFT OVARY: ICD-10-CM

## 2019-02-24 LAB
ANION GAP SERPL CALC-SCNC: 6 MMOL/L (ref 0–18)
APTT PPP: 35.3 SECONDS (ref 23.2–35.3)
B-HCG UR QL: NEGATIVE
BASOPHILS # BLD AUTO: 0.03 X10(3) UL (ref 0–0.2)
BASOPHILS NFR BLD AUTO: 0.3 %
BILIRUB UR QL: NEGATIVE
BUN BLD-MCNC: 8 MG/DL (ref 7–18)
BUN/CREAT SERPL: 10.1 (ref 10–20)
CALCIUM BLD-MCNC: 9.3 MG/DL (ref 8.5–10.1)
CHLORIDE SERPL-SCNC: 106 MMOL/L (ref 98–107)
CLARITY UR: CLEAR
CO2 SERPL-SCNC: 26 MMOL/L (ref 21–32)
COLOR UR: COLORLESS
CREAT BLD-MCNC: 0.79 MG/DL (ref 0.55–1.02)
DEPRECATED RDW RBC AUTO: 41.3 FL (ref 35.1–46.3)
EOSINOPHIL # BLD AUTO: 0.11 X10(3) UL (ref 0–0.7)
EOSINOPHIL NFR BLD AUTO: 1.2 %
ERYTHROCYTE [DISTWIDTH] IN BLOOD BY AUTOMATED COUNT: 13.4 % (ref 11–15)
GLUCOSE BLD-MCNC: 88 MG/DL (ref 70–99)
GLUCOSE UR-MCNC: NEGATIVE MG/DL
HCT VFR BLD AUTO: 42.7 % (ref 35–48)
HGB BLD-MCNC: 13.6 G/DL (ref 12–16)
IMM GRANULOCYTES # BLD AUTO: 0.01 X10(3) UL (ref 0–1)
IMM GRANULOCYTES NFR BLD: 0.1 %
INR BLD: 1.1 (ref 0.9–1.2)
KETONES UR-MCNC: NEGATIVE MG/DL
LEUKOCYTE ESTERASE UR QL STRIP.AUTO: NEGATIVE
LYMPHOCYTES # BLD AUTO: 2.67 X10(3) UL (ref 1–4)
LYMPHOCYTES NFR BLD AUTO: 30.3 %
MCH RBC QN AUTO: 27.3 PG (ref 26–34)
MCHC RBC AUTO-ENTMCNC: 31.9 G/DL (ref 31–37)
MCV RBC AUTO: 85.7 FL (ref 80–100)
MONOCYTES # BLD AUTO: 0.59 X10(3) UL (ref 0.1–1)
MONOCYTES NFR BLD AUTO: 6.7 %
NEUTROPHILS # BLD AUTO: 5.41 X10 (3) UL (ref 1.5–7.7)
NEUTROPHILS # BLD AUTO: 5.41 X10(3) UL (ref 1.5–7.7)
NEUTROPHILS NFR BLD AUTO: 61.4 %
NITRITE UR QL STRIP.AUTO: NEGATIVE
OSMOLALITY SERPL CALC.SUM OF ELEC: 284 MOSM/KG (ref 275–295)
PH UR: 7 [PH] (ref 5–8)
PLATELET # BLD AUTO: 344 10(3)UL (ref 150–450)
POTASSIUM SERPL-SCNC: 4 MMOL/L (ref 3.5–5.1)
PROT UR-MCNC: NEGATIVE MG/DL
PROTHROMBIN TIME: 14.2 SECONDS (ref 11.8–14.5)
RBC # BLD AUTO: 4.98 X10(6)UL (ref 3.8–5.3)
RBC #/AREA URNS AUTO: 1 /HPF
SODIUM SERPL-SCNC: 138 MMOL/L (ref 136–145)
SP GR UR STRIP: 1 (ref 1–1.03)
UROBILINOGEN UR STRIP-ACNC: <2
VIT C UR-MCNC: NEGATIVE MG/DL
WBC # BLD AUTO: 8.8 X10(3) UL (ref 4–11)
WBC #/AREA URNS AUTO: <1 /HPF

## 2019-02-24 PROCEDURE — 96361 HYDRATE IV INFUSION ADD-ON: CPT

## 2019-02-24 PROCEDURE — 85730 THROMBOPLASTIN TIME PARTIAL: CPT | Performed by: EMERGENCY MEDICINE

## 2019-02-24 PROCEDURE — 81025 URINE PREGNANCY TEST: CPT

## 2019-02-24 PROCEDURE — 76856 US EXAM PELVIC COMPLETE: CPT | Performed by: EMERGENCY MEDICINE

## 2019-02-24 PROCEDURE — 81001 URINALYSIS AUTO W/SCOPE: CPT | Performed by: EMERGENCY MEDICINE

## 2019-02-24 PROCEDURE — 99284 EMERGENCY DEPT VISIT MOD MDM: CPT

## 2019-02-24 PROCEDURE — 85610 PROTHROMBIN TIME: CPT | Performed by: EMERGENCY MEDICINE

## 2019-02-24 PROCEDURE — 76830 TRANSVAGINAL US NON-OB: CPT | Performed by: EMERGENCY MEDICINE

## 2019-02-24 PROCEDURE — 80048 BASIC METABOLIC PNL TOTAL CA: CPT | Performed by: EMERGENCY MEDICINE

## 2019-02-24 PROCEDURE — 85025 COMPLETE CBC W/AUTO DIFF WBC: CPT | Performed by: EMERGENCY MEDICINE

## 2019-02-24 PROCEDURE — 96374 THER/PROPH/DIAG INJ IV PUSH: CPT

## 2019-02-24 PROCEDURE — 93975 VASCULAR STUDY: CPT | Performed by: EMERGENCY MEDICINE

## 2019-02-24 RX ORDER — KETOROLAC TROMETHAMINE 30 MG/ML
30 INJECTION, SOLUTION INTRAMUSCULAR; INTRAVENOUS ONCE
Status: COMPLETED | OUTPATIENT
Start: 2019-02-24 | End: 2019-02-24

## 2019-02-24 RX ORDER — NAPROXEN 500 MG/1
500 TABLET ORAL 2 TIMES DAILY PRN
Qty: 20 TABLET | Refills: 0 | Status: SHIPPED | OUTPATIENT
Start: 2019-02-24 | End: 2019-03-03

## 2019-02-25 NOTE — ED PROVIDER NOTES
Patient Seen in: [facility] AND [facility] Emergency Department    History   Patient presents with:  Eval-G (gynecologic)    Stated Complaint: Eval-G    HPI    Patient is a 28-year-old female who presents with 4 days of vaginal bleeding.   She states that she had distension. TTP in suprapubic area and LLQ. No guarding or rigidity, no distension, no masses palpated  : no cervical tenderness. No adnexal masses or tenderness.  Minimal bleeding from vaginal vault  Extremities: FROM of all extremities, no cyanosis/club Bebo Vance M.D. Patient notified of ultrasound results. Bleeding minimal at this time and vitals and labs stable.   Patient will follow-up with gynecology for further management of her ovarian cyst.  She did have improvement in her pain with

## 2019-02-25 NOTE — ED INITIAL ASSESSMENT (HPI)
Pt states that she had her period approx 10 days ago, and then began to bleed again 4 days ago and reports that bleeding is heavy with clots. Also reports some pain with urination and left lower quadrant pain.

## 2019-02-25 NOTE — ED NOTES
Patient had a normal period 10 days ago. @ days after her period ended, she began spotting. Thursday, she began to bleed more heavily, and yesterday, she began soaking one pad per hour. Also C/O LLQ pain. Patient has a history of irregular periods.

## 2019-03-04 PROCEDURE — 88175 CYTOPATH C/V AUTO FLUID REDO: CPT | Performed by: OBSTETRICS & GYNECOLOGY

## 2019-03-04 PROCEDURE — 87624 HPV HI-RISK TYP POOLED RSLT: CPT | Performed by: OBSTETRICS & GYNECOLOGY

## 2019-03-22 ENCOUNTER — WALK IN (OUTPATIENT)
Dept: URGENT CARE | Age: 39
End: 2019-03-22

## 2019-03-22 VITALS
TEMPERATURE: 97.6 F | HEIGHT: 66 IN | OXYGEN SATURATION: 96 % | BODY MASS INDEX: 21.69 KG/M2 | RESPIRATION RATE: 16 BRPM | DIASTOLIC BLOOD PRESSURE: 66 MMHG | WEIGHT: 135 LBS | SYSTOLIC BLOOD PRESSURE: 100 MMHG | HEART RATE: 73 BPM

## 2019-03-22 DIAGNOSIS — J10.1 INFLUENZA A: Primary | ICD-10-CM

## 2019-03-22 LAB
FLUAV AG UPPER RESP QL IA.RAPID: POSITIVE
FLUBV AG UPPER RESP QL IA.RAPID: NEGATIVE
INTERNAL PROCEDURAL CONTROLS ACCEPTABLE: YES
S PYO AG THROAT QL IA.RAPID: NEGATIVE

## 2019-03-22 PROCEDURE — 87804 INFLUENZA ASSAY W/OPTIC: CPT | Performed by: NURSE PRACTITIONER

## 2019-03-22 PROCEDURE — 99203 OFFICE O/P NEW LOW 30 MIN: CPT | Performed by: NURSE PRACTITIONER

## 2019-03-22 PROCEDURE — 87880 STREP A ASSAY W/OPTIC: CPT | Performed by: NURSE PRACTITIONER

## 2019-03-22 RX ORDER — OSELTAMIVIR PHOSPHATE 75 MG/1
75 CAPSULE ORAL 2 TIMES DAILY
Qty: 10 CAPSULE | Refills: 0 | Status: SHIPPED | OUTPATIENT
Start: 2019-03-22 | End: 2019-03-27

## 2019-03-22 ASSESSMENT — ENCOUNTER SYMPTOMS
SORE THROAT: 1
DIARRHEA: 0
RHINORRHEA: 1
VOMITING: 1
ABDOMINAL PAIN: 0
NAUSEA: 0
SINUS PRESSURE: 1
TROUBLE SWALLOWING: 0
FATIGUE: 1
COUGH: 1
CHILLS: 1
WHEEZING: 0
SINUS PAIN: 1
EYES NEGATIVE: 1
FEVER: 1
STRIDOR: 0
SHORTNESS OF BREATH: 0
HEADACHES: 0
DIAPHORESIS: 1

## 2020-04-20 PROCEDURE — 99212 OFFICE O/P EST SF 10 MIN: CPT | Performed by: INTERNAL MEDICINE

## 2020-04-21 ENCOUNTER — NURSE TRIAGE (OUTPATIENT)
Dept: INTERNAL MEDICINE CLINIC | Facility: CLINIC | Age: 40
End: 2020-04-21

## 2020-04-21 ENCOUNTER — TELEPHONE (OUTPATIENT)
Dept: INTERNAL MEDICINE CLINIC | Facility: CLINIC | Age: 40
End: 2020-04-21

## 2020-04-21 DIAGNOSIS — B34.9 VIRAL SYNDROME: Primary | ICD-10-CM

## 2020-04-21 RX ORDER — AZITHROMYCIN 250 MG/1
TABLET, FILM COATED ORAL
Qty: 6 TABLET | Refills: 0 | Status: SHIPPED | OUTPATIENT
Start: 2020-04-21 | End: 2020-04-26

## 2020-04-21 NOTE — TELEPHONE ENCOUNTER
Action Requested: Summary for Provider     []  Critical Lab, Recommendations Needed  [x] Need Additional Advice  []   FYI    [x]   Need Orders  [] Need Medications Sent to Pharmacy  []  Other     SUMMARY: Patient flew to [place] on 4/10/2020 because [facility]

## 2020-04-22 NOTE — TELEPHONE ENCOUNTER
Virtual Telephone Check-In    Darnellal JOY Meyer verbally consents to a Virtual/Telephone Check-In visit on 04/21/20. Patient understands and accepts financial responsibility for any deductible, co-insurance and/or co-pays associated with this service. worsening respiratory symptoms that should prompt her going to emergency room.     Dereck Rubinstein, MD

## 2020-04-28 ENCOUNTER — TELEPHONE (OUTPATIENT)
Dept: INTERNAL MEDICINE CLINIC | Facility: CLINIC | Age: 40
End: 2020-04-28

## 2020-04-28 ENCOUNTER — APPOINTMENT (OUTPATIENT)
Dept: CT IMAGING | Facility: HOSPITAL | Age: 40
End: 2020-04-28
Attending: EMERGENCY MEDICINE
Payer: COMMERCIAL

## 2020-04-28 ENCOUNTER — APPOINTMENT (OUTPATIENT)
Dept: GENERAL RADIOLOGY | Facility: HOSPITAL | Age: 40
End: 2020-04-28
Attending: EMERGENCY MEDICINE
Payer: COMMERCIAL

## 2020-04-28 ENCOUNTER — HOSPITAL ENCOUNTER (EMERGENCY)
Facility: HOSPITAL | Age: 40
Discharge: HOME | End: 2020-04-28
Attending: EMERGENCY MEDICINE
Payer: COMMERCIAL

## 2020-04-28 VITALS
OXYGEN SATURATION: 97 % | RESPIRATION RATE: 16 BRPM | TEMPERATURE: 98 F | DIASTOLIC BLOOD PRESSURE: 76 MMHG | HEART RATE: 67 BPM | SYSTOLIC BLOOD PRESSURE: 113 MMHG

## 2020-04-28 DIAGNOSIS — U07.1 COVID-19: Primary | ICD-10-CM

## 2020-04-28 PROCEDURE — 80048 BASIC METABOLIC PNL TOTAL CA: CPT | Performed by: EMERGENCY MEDICINE

## 2020-04-28 PROCEDURE — 70450 CT HEAD/BRAIN W/O DYE: CPT | Performed by: EMERGENCY MEDICINE

## 2020-04-28 PROCEDURE — 36415 COLL VENOUS BLD VENIPUNCTURE: CPT

## 2020-04-28 PROCEDURE — 81001 URINALYSIS AUTO W/SCOPE: CPT | Performed by: EMERGENCY MEDICINE

## 2020-04-28 PROCEDURE — 81025 URINE PREGNANCY TEST: CPT

## 2020-04-28 PROCEDURE — 85025 COMPLETE CBC W/AUTO DIFF WBC: CPT | Performed by: EMERGENCY MEDICINE

## 2020-04-28 PROCEDURE — 99284 EMERGENCY DEPT VISIT MOD MDM: CPT

## 2020-04-28 PROCEDURE — 71045 X-RAY EXAM CHEST 1 VIEW: CPT | Performed by: EMERGENCY MEDICINE

## 2020-04-28 NOTE — TELEPHONE ENCOUNTER
Spoke to patient, she is ill for about 16 days, we assume that she has call with infection, was improving, 2 days ago started to experience severe headache, states worst headache of her life, her lip was quivering, she feels cold, has cough and feels short

## 2020-04-28 NOTE — ED INITIAL ASSESSMENT (HPI)
Pt c/o productive cough (green), dyspnea, +COVID exposure x2 weeks. Pt also c/o diffuse HA (+photophobia), neuro exam WNL. Pt states she has had N/V x3 days, prescribed zofran, azithromycin (finished). Pt last tylenol 2 hours ago.

## 2020-04-28 NOTE — TELEPHONE ENCOUNTER
Patient calling reports update  Had phone visit on 4/21/2020    Remains with headache, cough and feet are 'cool and slightly blue, feel tingling\"  She is not wearing any socks      Nauseated , slight shortness of breath   Feel upper lip is \" quivering\"

## 2020-04-28 NOTE — ED PROVIDER NOTES
Patient Seen in: [facility] AND [facility] Emergency Department      History   Patient presents with:  Headache  Dyspnea MILANA SOB    Stated Complaint: r/o covid, headche    HPI     35 yoF with no long-term medical history presenting for evaluation of headache.   Peter Roman Comments: No meningismus  Cardiovascular:      Rate and Rhythm: Normal rate and regular rhythm. Heart sounds: Normal heart sounds. Pulmonary:      Effort: Pulmonary effort is normal. No tachypnea or respiratory distress.       Breath sounds: Normal b results for these tests on the individual orders. RAINBOW DRAW BLUE   RAINBOW DRAW GOLD          Ct Brain Or Head (19494)    Result Date: 4/28/2020  CONCLUSION:   No acute intracranial process by noncontrast CT technique.    Dictated by (CST): McLean

## 2020-04-29 NOTE — TELEPHONE ENCOUNTER
Spoke to patient, states that she slept all day, continues to have headache but not severe, bilateral ear pain, stomach feels upset and bloated, she is trying to eat, now feels body aches.   Denies cough, denies fever, I advised patient to stay quarantined

## 2020-05-18 ENCOUNTER — TELEPHONE (OUTPATIENT)
Dept: INTERNAL MEDICINE CLINIC | Facility: CLINIC | Age: 40
End: 2020-05-18

## 2020-05-18 NOTE — TELEPHONE ENCOUNTER
Patient requesting an appointment for a physical. She tested positive for covid about a month ago. States she is no longer having symptoms and feels good.  Can she be scheduled

## 2020-05-27 ENCOUNTER — OFFICE VISIT (OUTPATIENT)
Dept: INTERNAL MEDICINE CLINIC | Facility: CLINIC | Age: 40
End: 2020-05-27

## 2020-05-27 VITALS
HEART RATE: 80 BPM | BODY MASS INDEX: 24.43 KG/M2 | RESPIRATION RATE: 20 BRPM | SYSTOLIC BLOOD PRESSURE: 112 MMHG | TEMPERATURE: 98 F | DIASTOLIC BLOOD PRESSURE: 69 MMHG | HEIGHT: 66 IN | WEIGHT: 152 LBS

## 2020-05-27 DIAGNOSIS — N92.6 IRREGULAR PERIODS: ICD-10-CM

## 2020-05-27 DIAGNOSIS — R26.89 BALANCE PROBLEM: ICD-10-CM

## 2020-05-27 DIAGNOSIS — Z00.00 PHYSICAL EXAM, ANNUAL: Primary | ICD-10-CM

## 2020-05-27 DIAGNOSIS — E55.9 VITAMIN D DEFICIENCY: ICD-10-CM

## 2020-05-27 PROBLEM — R23.3 SPONTANEOUS ECCHYMOSES: Status: RESOLVED | Noted: 2017-06-20 | Resolved: 2020-05-27

## 2020-05-27 PROCEDURE — 99396 PREV VISIT EST AGE 40-64: CPT | Performed by: INTERNAL MEDICINE

## 2020-05-27 RX ORDER — PYRIDOXINE HCL (VITAMIN B6) 100 MG
TABLET ORAL
COMMUNITY
End: 2021-02-25

## 2020-05-28 ENCOUNTER — TELEPHONE (OUTPATIENT)
Dept: OBGYN CLINIC | Facility: CLINIC | Age: 40
End: 2020-05-28

## 2020-05-28 NOTE — PROGRESS NOTES
HPI:    Patient ID: Marilyn Guardado is a 38year old female. Presents for physical exam.    HPI  Patient reports that overall she has been feeling well, she recovered from COVID-19 infection which she contracted about 5 weeks ago.   Fever and cough resol Negative for gait disturbance, paresthesias. Psychiatric:  Negative for inappropriate interaction and psychiatric symptoms       Current Outpatient Medications   Medication Sig Dispense Refill   • Calcium 500-125 MG-UNIT Oral Tab Take by mouth.      • nor person, place, and time. She has normal strength. No cranial nerve deficit, sensory deficit or motor deficit. She displays a negative Romberg sign.  Gait normal.   Nystagmus horizontal to the left, visual field by confrontation is normal, finger-nose pronat

## 2020-05-31 ENCOUNTER — HOSPITAL ENCOUNTER (OUTPATIENT)
Dept: MRI IMAGING | Age: 40
Discharge: HOME | End: 2020-05-31
Attending: INTERNAL MEDICINE
Payer: COMMERCIAL

## 2020-05-31 DIAGNOSIS — R26.89 BALANCE PROBLEM: ICD-10-CM

## 2020-05-31 PROCEDURE — 70553 MRI BRAIN STEM W/O & W/DYE: CPT | Performed by: INTERNAL MEDICINE

## 2020-05-31 PROCEDURE — A9575 INJ GADOTERATE MEGLUMI 0.1ML: HCPCS | Performed by: INTERNAL MEDICINE

## 2020-06-02 ENCOUNTER — TELEPHONE (OUTPATIENT)
Dept: INTERNAL MEDICINE CLINIC | Facility: CLINIC | Age: 40
End: 2020-06-02

## 2020-06-02 NOTE — TELEPHONE ENCOUNTER
Spoke to patient reported negative MRI of the brain, right maxillary sinus retention cyst, patient continues to complain of feeling forgetful, imbalance, injuring herself, advised her that I can refer her to see neurologist for failure evaluation, she want

## 2020-06-02 NOTE — TELEPHONE ENCOUNTER
Left message to call back, need to speak to patient regarding MRI results get the best number to call her today sometime during the day

## 2020-06-03 ENCOUNTER — LAB ENCOUNTER (OUTPATIENT)
Dept: LAB | Age: 40
End: 2020-06-03
Attending: INTERNAL MEDICINE
Payer: COMMERCIAL

## 2020-06-03 DIAGNOSIS — E55.9 VITAMIN D DEFICIENCY: ICD-10-CM

## 2020-06-03 DIAGNOSIS — R26.89 BALANCE PROBLEM: ICD-10-CM

## 2020-06-03 DIAGNOSIS — Z00.00 PHYSICAL EXAM, ANNUAL: ICD-10-CM

## 2020-06-03 PROCEDURE — 84439 ASSAY OF FREE THYROXINE: CPT

## 2020-06-03 PROCEDURE — 84443 ASSAY THYROID STIM HORMONE: CPT

## 2020-06-03 PROCEDURE — 36415 COLL VENOUS BLD VENIPUNCTURE: CPT

## 2020-06-03 PROCEDURE — 82306 VITAMIN D 25 HYDROXY: CPT

## 2020-06-03 PROCEDURE — 80053 COMPREHEN METABOLIC PANEL: CPT

## 2020-06-03 PROCEDURE — 85025 COMPLETE CBC W/AUTO DIFF WBC: CPT

## 2020-06-03 PROCEDURE — 82607 VITAMIN B-12: CPT

## 2020-06-03 PROCEDURE — 80061 LIPID PANEL: CPT

## 2020-06-03 PROCEDURE — 82728 ASSAY OF FERRITIN: CPT

## 2020-06-05 ENCOUNTER — HOSPITAL ENCOUNTER (OUTPATIENT)
Dept: ULTRASOUND IMAGING | Age: 40
Discharge: HOME | End: 2020-06-05
Attending: INTERNAL MEDICINE
Payer: COMMERCIAL

## 2020-06-05 ENCOUNTER — TELEPHONE (OUTPATIENT)
Dept: INTERNAL MEDICINE CLINIC | Facility: CLINIC | Age: 40
End: 2020-06-05

## 2020-06-05 DIAGNOSIS — N92.6 IRREGULAR PERIODS: ICD-10-CM

## 2020-06-05 PROCEDURE — 76856 US EXAM PELVIC COMPLETE: CPT | Performed by: INTERNAL MEDICINE

## 2020-06-05 PROCEDURE — 76830 TRANSVAGINAL US NON-OB: CPT | Performed by: INTERNAL MEDICINE

## 2020-06-05 NOTE — TELEPHONE ENCOUNTER
Spoke to patient here with patient lab test results all normal, low ferritin level but no anemia, advised patient to start iron supplement Feosol 325 mg 1 tablet twice a day if tolerated with food and acidic juice and advised to repeat blood test in 3 regan

## 2020-06-08 ENCOUNTER — TELEMEDICINE (OUTPATIENT)
Dept: INTERNAL MEDICINE CLINIC | Facility: CLINIC | Age: 40
End: 2020-06-08

## 2020-06-08 ENCOUNTER — NURSE TRIAGE (OUTPATIENT)
Dept: INTERNAL MEDICINE CLINIC | Facility: CLINIC | Age: 40
End: 2020-06-08

## 2020-06-08 DIAGNOSIS — J02.9 ACUTE VIRAL PHARYNGITIS: Primary | ICD-10-CM

## 2020-06-08 PROCEDURE — 99213 OFFICE O/P EST LOW 20 MIN: CPT | Performed by: INTERNAL MEDICINE

## 2020-06-08 NOTE — TELEPHONE ENCOUNTER
Please reply to pool: EM RN TRIAGE    Action Requested: Summary for Provider     []  Critical Lab, Recommendations Needed  [] Need Additional Advice  []   FYI    []   Need Orders  [] Need Medications Sent to Pharmacy  []  Other     SUMMARY: Patient not

## 2020-06-09 ENCOUNTER — OFFICE VISIT (OUTPATIENT)
Dept: OBGYN CLINIC | Facility: CLINIC | Age: 40
End: 2020-06-09

## 2020-06-09 VITALS
HEIGHT: 66 IN | BODY MASS INDEX: 23.95 KG/M2 | WEIGHT: 149 LBS | DIASTOLIC BLOOD PRESSURE: 75 MMHG | SYSTOLIC BLOOD PRESSURE: 115 MMHG

## 2020-06-09 DIAGNOSIS — Z97.5 CONTRACEPTION, DEVICE INTRAUTERINE: Primary | ICD-10-CM

## 2020-06-09 PROCEDURE — 99203 OFFICE O/P NEW LOW 30 MIN: CPT | Performed by: OBSTETRICS & GYNECOLOGY

## 2020-06-09 NOTE — PROGRESS NOTES
Virtual Telephone Check-In    Sal Meyer verballconsents to a Virtual/Telephone Check-In visit on 6/8/2020      Patient understands and accepts financial responsibility for any deductible, co-insurance and/or co-pays associated with this service. Physical Exam  Alert oriented x3, able to speak in full sentences         ASSESSMENT/PLAN:   Pharyngitis viral, patient will continue Augmentin, gargle throat with salty water to 3 times a day for 3 4 days, report if symptoms persist worsening  No ord

## 2020-06-09 NOTE — PROGRESS NOTES
HPI:    Patient ID: Bradley Daly is a 38year old female. Patient desires to discuss IUD options. Discussed Paragard and [place] IUDs. Patient reports that her menses are heavy but regular. She is sensitive with hormonal medication.   Discussed rafael diagnosis)  All questions answered. Literature to Mirena IUD given. To premedicate 30 minutes prior to insertion. To call on First day of menses but not to schedule until menses starts tapering off.   No orders of the defined types were placed in this en

## 2020-06-16 ENCOUNTER — HOSPITAL ENCOUNTER (EMERGENCY)
Facility: HOSPITAL | Age: 40
Discharge: HOME | End: 2020-06-16
Attending: EMERGENCY MEDICINE
Payer: COMMERCIAL

## 2020-06-16 VITALS
SYSTOLIC BLOOD PRESSURE: 128 MMHG | WEIGHT: 145 LBS | TEMPERATURE: 98 F | BODY MASS INDEX: 23.3 KG/M2 | HEIGHT: 66 IN | DIASTOLIC BLOOD PRESSURE: 70 MMHG | HEART RATE: 80 BPM | OXYGEN SATURATION: 99 % | RESPIRATION RATE: 16 BRPM

## 2020-06-16 DIAGNOSIS — R42 DIZZINESS: Primary | ICD-10-CM

## 2020-06-16 PROCEDURE — 80048 BASIC METABOLIC PNL TOTAL CA: CPT | Performed by: EMERGENCY MEDICINE

## 2020-06-16 PROCEDURE — 85025 COMPLETE CBC W/AUTO DIFF WBC: CPT | Performed by: EMERGENCY MEDICINE

## 2020-06-16 PROCEDURE — 36415 COLL VENOUS BLD VENIPUNCTURE: CPT

## 2020-06-16 PROCEDURE — 93010 ELECTROCARDIOGRAM REPORT: CPT | Performed by: EMERGENCY MEDICINE

## 2020-06-16 PROCEDURE — 99283 EMERGENCY DEPT VISIT LOW MDM: CPT

## 2020-06-16 PROCEDURE — 93005 ELECTROCARDIOGRAM TRACING: CPT

## 2020-06-16 PROCEDURE — 81025 URINE PREGNANCY TEST: CPT

## 2020-06-17 NOTE — ED PROVIDER NOTES
Patient Seen in: [facility] AND [facility] Emergency Department      History   Patient presents with:  Dizziness    Stated Complaint: n/v/dizzy    HPI    Pt is 37 yo F who p/w a \"rush\" of pressure behind her eyes x 2 days.  She states it occurs every few minute distension  Extremities: FROM of all extremities, no cyanosis/clubbing/edema  Neuro: CN intact, normal speech, normal gait, 5/5 motor strength in all extremities, no focal deficits  SKIN: warm, dry, no rashes        ED Course     Labs Reviewed   BASIC META

## 2020-06-17 NOTE — ED INITIAL ASSESSMENT (HPI)
Patient complains of dizziness, states since April she has been having memory problems, dizziness, coordination problems

## 2020-06-17 NOTE — ED NOTES
Pt presents to ED for a pressure in her frontal head that started 2 days ago. Pt states when that pressure happens her vision goes blurry and she feels faint. Pt states she feels nauseous. Pt denies headaches or pain.  Pt states ever since she had coronavir

## 2020-06-23 ENCOUNTER — TELEPHONE (OUTPATIENT)
Dept: OBGYN CLINIC | Facility: CLINIC | Age: 40
End: 2020-06-23

## 2020-06-23 NOTE — TELEPHONE ENCOUNTER
20  verified with Pt. Rickie. Was schedule for  at 11am with Dr. Kurt Heredia. Pt verbalized understanding. Pt did not have any further questions.

## 2020-06-23 NOTE — TELEPHONE ENCOUNTER
Patient calling to schedule IUD insert, patient indicates she will be on her 5th day of menses this Friday, please call at:[phone],thanks.

## 2020-06-26 ENCOUNTER — OFFICE VISIT (OUTPATIENT)
Dept: OBGYN CLINIC | Facility: CLINIC | Age: 40
End: 2020-06-26

## 2020-06-26 VITALS — BODY MASS INDEX: 24 KG/M2 | DIASTOLIC BLOOD PRESSURE: 70 MMHG | SYSTOLIC BLOOD PRESSURE: 116 MMHG | WEIGHT: 151 LBS

## 2020-06-26 DIAGNOSIS — Z01.419 ENCOUNTER FOR WELL WOMAN EXAM WITH ROUTINE GYNECOLOGICAL EXAM: ICD-10-CM

## 2020-06-26 DIAGNOSIS — Z01.812 PRE-PROCEDURAL LABORATORY EXAMINATION: Primary | ICD-10-CM

## 2020-06-26 DIAGNOSIS — Z30.430 ENCOUNTER FOR INSERTION OF INTRAUTERINE CONTRACEPTIVE DEVICE: ICD-10-CM

## 2020-06-26 PROCEDURE — 81025 URINE PREGNANCY TEST: CPT | Performed by: OBSTETRICS & GYNECOLOGY

## 2020-06-26 PROCEDURE — 58300 INSERT INTRAUTERINE DEVICE: CPT | Performed by: OBSTETRICS & GYNECOLOGY

## 2020-06-26 RX ORDER — CLINDAMYCIN HYDROCHLORIDE 150 MG/1
CAPSULE ORAL 3 TIMES DAILY
COMMUNITY
End: 2021-10-07

## 2020-08-05 ENCOUNTER — OFFICE VISIT (OUTPATIENT)
Dept: OBGYN CLINIC | Facility: CLINIC | Age: 40
End: 2020-08-05

## 2020-08-05 VITALS
HEIGHT: 66 IN | DIASTOLIC BLOOD PRESSURE: 74 MMHG | SYSTOLIC BLOOD PRESSURE: 114 MMHG | WEIGHT: 151.38 LBS | BODY MASS INDEX: 24.33 KG/M2

## 2020-08-05 DIAGNOSIS — Z30.431 IUD CHECK UP: Primary | ICD-10-CM

## 2020-08-05 PROCEDURE — 3078F DIAST BP <80 MM HG: CPT | Performed by: OBSTETRICS & GYNECOLOGY

## 2020-08-05 PROCEDURE — 3074F SYST BP LT 130 MM HG: CPT | Performed by: OBSTETRICS & GYNECOLOGY

## 2020-08-05 PROCEDURE — 3008F BODY MASS INDEX DOCD: CPT | Performed by: OBSTETRICS & GYNECOLOGY

## 2020-08-05 PROCEDURE — 99213 OFFICE O/P EST LOW 20 MIN: CPT | Performed by: OBSTETRICS & GYNECOLOGY

## 2020-08-05 RX ORDER — HYDROCORTISONE 25 MG/G
1 CREAM TOPICAL 2 TIMES DAILY
Qty: 1 TUBE | Refills: 0 | Status: SHIPPED | OUTPATIENT
Start: 2020-08-05 | End: 2021-04-15

## 2020-08-05 NOTE — PROGRESS NOTES
HPI:    Patient ID: Ning Reddy is a 38year old female. Patient here for Mirena IUD check. Reports spotting but now has resolved. Thinking about removal in three or four months. Desires treatment for hemorrhoids.          Review of Systems   C questions answered. Mirena IUD surveillance reviewed. Medication reviewed. No orders of the defined types were placed in this encounter.       Meds This Visit:  Requested Prescriptions     Signed Prescriptions Disp Refills   • Hydrocortisone (ANUSOL-HC

## 2020-09-04 ENCOUNTER — TELEPHONE (OUTPATIENT)
Dept: OBGYN CLINIC | Facility: CLINIC | Age: 40
End: 2020-09-04

## 2020-09-04 NOTE — TELEPHONE ENCOUNTER
Mirena IUD insertion 3 months ago. Pt had a normal IUD check on 08/05. Pt has been having spotting on and off since insertion. On Wednesday pt had moderate-severe cramping and had spotting Tuesday and Wednesday.  Today, pt says she just feels generalized so

## 2020-09-04 NOTE — TELEPHONE ENCOUNTER
Patient having pain on stomach and back describes as contractions and spotting, please call at:[phone],thanks.   *no bleeding now, but concerned IUD may have moved

## 2020-09-10 ENCOUNTER — TELEPHONE (OUTPATIENT)
Dept: OBGYN CLINIC | Facility: CLINIC | Age: 40
End: 2020-09-10

## 2020-09-11 ENCOUNTER — HOSPITAL ENCOUNTER (EMERGENCY)
Facility: HOSPITAL | Age: 40
Discharge: HOME | End: 2020-09-11
Attending: EMERGENCY MEDICINE
Payer: COMMERCIAL

## 2020-09-11 VITALS
HEART RATE: 67 BPM | SYSTOLIC BLOOD PRESSURE: 105 MMHG | WEIGHT: 140 LBS | BODY MASS INDEX: 22.5 KG/M2 | RESPIRATION RATE: 16 BRPM | TEMPERATURE: 98 F | OXYGEN SATURATION: 100 % | DIASTOLIC BLOOD PRESSURE: 65 MMHG | HEIGHT: 66 IN

## 2020-09-11 DIAGNOSIS — Z30.432 ENCOUNTER FOR IUD REMOVAL: ICD-10-CM

## 2020-09-11 DIAGNOSIS — N93.9 VAGINAL BLEEDING: Primary | ICD-10-CM

## 2020-09-11 LAB
ANION GAP SERPL CALC-SCNC: 6 MMOL/L (ref 0–18)
APTT PPP: 30.3 SECONDS (ref 23.2–35.3)
B-HCG UR QL: NEGATIVE
BACTERIA UR QL AUTO: NEGATIVE /HPF
BASOPHILS # BLD AUTO: 0.03 X10(3) UL (ref 0–0.2)
BASOPHILS NFR BLD AUTO: 0.4 %
BILIRUB UR QL: NEGATIVE
BUN BLD-MCNC: 8 MG/DL (ref 7–18)
BUN/CREAT SERPL: 9 (ref 10–20)
CALCIUM BLD-MCNC: 9.5 MG/DL (ref 8.5–10.1)
CHLORIDE SERPL-SCNC: 105 MMOL/L (ref 98–112)
CLARITY UR: CLEAR
CO2 SERPL-SCNC: 29 MMOL/L (ref 21–32)
COLOR UR: YELLOW
CREAT BLD-MCNC: 0.89 MG/DL (ref 0.55–1.02)
DEPRECATED RDW RBC AUTO: 41.4 FL (ref 35.1–46.3)
EOSINOPHIL # BLD AUTO: 0.16 X10(3) UL (ref 0–0.7)
EOSINOPHIL NFR BLD AUTO: 2.1 %
ERYTHROCYTE [DISTWIDTH] IN BLOOD BY AUTOMATED COUNT: 13.4 % (ref 11–15)
GLUCOSE BLD-MCNC: 101 MG/DL (ref 70–99)
GLUCOSE UR-MCNC: NEGATIVE MG/DL
HCT VFR BLD AUTO: 40.5 % (ref 35–48)
HGB BLD-MCNC: 13 G/DL (ref 12–16)
IMM GRANULOCYTES # BLD AUTO: 0.03 X10(3) UL (ref 0–1)
IMM GRANULOCYTES NFR BLD: 0.4 %
INR BLD: 0.89 (ref 0.9–1.2)
KETONES UR-MCNC: NEGATIVE MG/DL
LEUKOCYTE ESTERASE UR QL STRIP.AUTO: NEGATIVE
LYMPHOCYTES # BLD AUTO: 2.57 X10(3) UL (ref 1–4)
LYMPHOCYTES NFR BLD AUTO: 33.1 %
MCH RBC QN AUTO: 27.2 PG (ref 26–34)
MCHC RBC AUTO-ENTMCNC: 32.1 G/DL (ref 31–37)
MCV RBC AUTO: 84.7 FL (ref 80–100)
MONOCYTES # BLD AUTO: 0.42 X10(3) UL (ref 0.1–1)
MONOCYTES NFR BLD AUTO: 5.4 %
NEUTROPHILS # BLD AUTO: 4.55 X10 (3) UL (ref 1.5–7.7)
NEUTROPHILS # BLD AUTO: 4.55 X10(3) UL (ref 1.5–7.7)
NEUTROPHILS NFR BLD AUTO: 58.6 %
NITRITE UR QL STRIP.AUTO: NEGATIVE
OSMOLALITY SERPL CALC.SUM OF ELEC: 288 MOSM/KG (ref 275–295)
PH UR: 7 [PH] (ref 5–8)
PLATELET # BLD AUTO: 344 10(3)UL (ref 150–450)
POTASSIUM SERPL-SCNC: 3.8 MMOL/L (ref 3.5–5.1)
PROT UR-MCNC: NEGATIVE MG/DL
PROTHROMBIN TIME: 11.9 SECONDS (ref 11.8–14.5)
RBC # BLD AUTO: 4.78 X10(6)UL (ref 3.8–5.3)
RBC #/AREA URNS AUTO: 1 /HPF
SODIUM SERPL-SCNC: 140 MMOL/L (ref 136–145)
SP GR UR STRIP: 1 (ref 1–1.03)
UROBILINOGEN UR STRIP-ACNC: <2
WBC # BLD AUTO: 7.8 X10(3) UL (ref 4–11)
WBC #/AREA URNS AUTO: <1 /HPF

## 2020-09-11 PROCEDURE — 36415 COLL VENOUS BLD VENIPUNCTURE: CPT

## 2020-09-11 PROCEDURE — 93005 ELECTROCARDIOGRAM TRACING: CPT

## 2020-09-11 PROCEDURE — 85610 PROTHROMBIN TIME: CPT | Performed by: EMERGENCY MEDICINE

## 2020-09-11 PROCEDURE — 99284 EMERGENCY DEPT VISIT MOD MDM: CPT

## 2020-09-11 PROCEDURE — 85025 COMPLETE CBC W/AUTO DIFF WBC: CPT | Performed by: EMERGENCY MEDICINE

## 2020-09-11 PROCEDURE — 85730 THROMBOPLASTIN TIME PARTIAL: CPT | Performed by: EMERGENCY MEDICINE

## 2020-09-11 PROCEDURE — 81025 URINE PREGNANCY TEST: CPT

## 2020-09-11 PROCEDURE — 81001 URINALYSIS AUTO W/SCOPE: CPT | Performed by: EMERGENCY MEDICINE

## 2020-09-11 PROCEDURE — 93010 ELECTROCARDIOGRAM REPORT: CPT | Performed by: EMERGENCY MEDICINE

## 2020-09-11 PROCEDURE — 80048 BASIC METABOLIC PNL TOTAL CA: CPT | Performed by: EMERGENCY MEDICINE

## 2020-09-11 NOTE — ED NOTES
PT safe to DC home per MD. Martín Weiss to dress self. DC teaching done, pt verbalizes understanding. Ambulatory with steady gait to exit.

## 2020-09-11 NOTE — ED INITIAL ASSESSMENT (HPI)
Lower abd pain and lower back pain x 1 week. Also having heavy vaginal bleeding since Monday. IUD inserted 3 months ago. Since bleeding started, she c/o feeling tired, having headache, and feeling sob with exertion. No resp distress noted.

## 2020-09-11 NOTE — ED NOTES
Care assumed of this pt from Evelyne Cockayne, [place]. Pt resting on cart. Given warm blanket and socks. Updated on plan of care, verbalizes understanding.

## 2020-09-11 NOTE — ED PROVIDER NOTES
Patient Seen in: [facility] AND [facility] Emergency Department      History   Patient presents with:  Abdomen/Flank Pain    Stated Complaint: TL - possible IUD issue; pelvic/back pain    HPI    Pt is 39 yo F who p/w lower abdominal cramping x 1 week.  +intermitt extremities, no cyanosis/clubbing/edema  Neuro: CN intact, normal speech, normal gait, 5/5 motor strength in all extremities, no focal deficits  SKIN: warm, dry, no rashes        ED Course     Labs Reviewed   BASIC METABOLIC PANEL (8) - Abnormal; Notable f Terry 82  [phone]    Schedule an appointment as soon as possible for a visit            Medications Prescribed:  Current Discharge Medication List

## 2020-09-18 NOTE — TELEPHONE ENCOUNTER
Advise pt be seen in office or in ER
Pt Name and  verified. Pt states that she has been cramping a lot for a couple of days. Was spotting but has started bleeding bright red blood and it was heavy for about two days.  Pt states that she felt like she had a temp last night and states that
Pt having pain and bright red blood from IUD , pt said she needs to be seen
Pt seen in ED
lmtcb
day(s)

## 2020-11-04 ENCOUNTER — HOSPITAL ENCOUNTER (OUTPATIENT)
Age: 40
Discharge: HOME | End: 2020-11-04
Attending: EMERGENCY MEDICINE
Payer: COMMERCIAL

## 2020-11-04 VITALS
OXYGEN SATURATION: 99 % | RESPIRATION RATE: 18 BRPM | DIASTOLIC BLOOD PRESSURE: 63 MMHG | SYSTOLIC BLOOD PRESSURE: 108 MMHG | WEIGHT: 145 LBS | TEMPERATURE: 98 F | BODY MASS INDEX: 23 KG/M2 | HEART RATE: 68 BPM

## 2020-11-04 DIAGNOSIS — J06.9 VIRAL URI: Primary | ICD-10-CM

## 2020-11-04 PROCEDURE — 99213 OFFICE O/P EST LOW 20 MIN: CPT

## 2020-11-04 PROCEDURE — 87430 STREP A AG IA: CPT

## 2020-11-04 NOTE — ED INITIAL ASSESSMENT (HPI)
+ sick contact with covid on Sunday, h/o + covid in April, here for sore throat, body aches since last nigh, no fever

## 2020-11-04 NOTE — ED PROVIDER NOTES
Patient Seen in: [facility]      History   Patient presents with:  Testing    Stated Complaint: covid test    HPI    The patient is a 48-year-old female with past history of Covid in 4/20 who presents now with sore throat, body aches, fatigue erythema  Chest: Clear to auscultation, no tenderness  Cardiovascular: Regular rate and rhythm without murmur  Abdomen: Soft, nontender and nondistended  Neurologic: Patient is awake, alert and oriented ×3.   The patient's motor strength is 5 out of 5 and s

## 2021-02-19 ENCOUNTER — OFFICE VISIT (OUTPATIENT)
Dept: INTERNAL MEDICINE CLINIC | Facility: CLINIC | Age: 41
End: 2021-02-19

## 2021-02-19 VITALS
RESPIRATION RATE: 18 BRPM | SYSTOLIC BLOOD PRESSURE: 103 MMHG | HEART RATE: 73 BPM | WEIGHT: 150 LBS | BODY MASS INDEX: 24 KG/M2 | DIASTOLIC BLOOD PRESSURE: 67 MMHG

## 2021-02-19 DIAGNOSIS — R22.0 CHEEK MASS: Primary | ICD-10-CM

## 2021-02-19 PROCEDURE — 3074F SYST BP LT 130 MM HG: CPT | Performed by: INTERNAL MEDICINE

## 2021-02-19 PROCEDURE — 3078F DIAST BP <80 MM HG: CPT | Performed by: INTERNAL MEDICINE

## 2021-02-19 PROCEDURE — 99213 OFFICE O/P EST LOW 20 MIN: CPT | Performed by: INTERNAL MEDICINE

## 2021-02-21 PROBLEM — R53.83 FATIGUE: Status: RESOLVED | Noted: 2017-06-20 | Resolved: 2021-02-21

## 2021-02-21 NOTE — PROGRESS NOTES
HPI:    Patient ID: Senait Thomas is a 38year old female.   Presents for evaluation of the mass right cheek    HPI  Patient reports that for about 6 months he has swelling tender in the tissue of the right cheek, states that she was waiting that lesion Pulmonary/Chest: Effort normal. No respiratory distress. She has no wheezes. Lymphadenopathy:     She has no cervical adenopathy. Neurological: She is alert and oriented to person, place, and time. No cranial nerve deficit or motor deficit.    Psychiatr

## 2021-02-22 ENCOUNTER — HOSPITAL ENCOUNTER (OUTPATIENT)
Dept: CT IMAGING | Age: 41
Discharge: HOME | End: 2021-02-22
Attending: INTERNAL MEDICINE
Payer: COMMERCIAL

## 2021-02-22 DIAGNOSIS — R22.0 CHEEK MASS: ICD-10-CM

## 2021-02-22 LAB — CREAT BLD-MCNC: 0.7 MG/DL

## 2021-02-22 PROCEDURE — 70491 CT SOFT TISSUE NECK W/DYE: CPT | Performed by: INTERNAL MEDICINE

## 2021-02-22 PROCEDURE — 82565 ASSAY OF CREATININE: CPT

## 2021-02-24 DIAGNOSIS — E04.1 THYROID NODULE: Primary | ICD-10-CM

## 2021-02-25 ENCOUNTER — OFFICE VISIT (OUTPATIENT)
Dept: OTOLARYNGOLOGY | Facility: CLINIC | Age: 41
End: 2021-02-25

## 2021-02-25 VITALS
BODY MASS INDEX: 24.11 KG/M2 | HEIGHT: 66 IN | WEIGHT: 150 LBS | SYSTOLIC BLOOD PRESSURE: 108 MMHG | TEMPERATURE: 99 F | DIASTOLIC BLOOD PRESSURE: 70 MMHG

## 2021-02-25 DIAGNOSIS — R22.0 MASS OF FACE: ICD-10-CM

## 2021-02-25 DIAGNOSIS — H92.01 RIGHT EAR PAIN: Primary | ICD-10-CM

## 2021-02-25 PROCEDURE — 3008F BODY MASS INDEX DOCD: CPT | Performed by: OTOLARYNGOLOGY

## 2021-02-25 PROCEDURE — 3078F DIAST BP <80 MM HG: CPT | Performed by: OTOLARYNGOLOGY

## 2021-02-25 PROCEDURE — 99243 OFF/OP CNSLTJ NEW/EST LOW 30: CPT | Performed by: OTOLARYNGOLOGY

## 2021-02-25 PROCEDURE — 3074F SYST BP LT 130 MM HG: CPT | Performed by: OTOLARYNGOLOGY

## 2021-02-25 RX ORDER — CELECOXIB 200 MG/1
200 CAPSULE ORAL DAILY PRN
Qty: 30 CAPSULE | Refills: 0 | Status: SHIPPED | OUTPATIENT
Start: 2021-02-25 | End: 2021-03-30

## 2021-02-25 RX ORDER — CYCLOBENZAPRINE HCL 5 MG
5 TABLET ORAL NIGHTLY
Qty: 30 TABLET | Refills: 1 | Status: SHIPPED | OUTPATIENT
Start: 2021-02-25 | End: 2021-10-07

## 2021-02-26 ENCOUNTER — HOSPITAL ENCOUNTER (OUTPATIENT)
Dept: ULTRASOUND IMAGING | Age: 41
Discharge: HOME | End: 2021-02-26
Attending: INTERNAL MEDICINE
Payer: COMMERCIAL

## 2021-02-26 DIAGNOSIS — E04.1 THYROID NODULE: ICD-10-CM

## 2021-02-26 PROCEDURE — 76536 US EXAM OF HEAD AND NECK: CPT | Performed by: INTERNAL MEDICINE

## 2021-02-26 NOTE — PROGRESS NOTES
Valarie Malone is a 38year old female.   Patient presents with:  Mass: Patient Presents with: mass on right side of face for 6 months       HISTORY OF PRESENT ILLNESS  She presents with a nearly 1 year history of feeling discomfort in her right cheek ex weight loss. ENMT Negative Drooling. Eyes Negative Blurred vision and vision changes. Respiratory Negative Dyspnea and wheezing. Cardio Negative Chest pain, irregular heartbeat/palpitations and syncope. GI Negative Abdominal pain and diarrhea. Right: Normal Left: Normal. Septum -Normal  Turbinates - Right: Normal, Left: Normal.       Current Outpatient Medications:   •  cyclobenzaprine 5 MG Oral Tab, Take 1 tablet (5 mg total) by mouth nightly., Disp: 30 tablet, Rfl: 1  •  celecoxib 200 MG Oral errors have been corrected. While every attempt is made to correct errors during dictation discrepancies may still exist    Cecil Han MD    2/25/2021    7:46 PM

## 2021-03-01 DIAGNOSIS — E04.1 THYROID NODULE: Primary | ICD-10-CM

## 2021-03-20 ENCOUNTER — PATIENT MESSAGE (OUTPATIENT)
Dept: TELEHEALTH | Age: 41
End: 2021-03-20

## 2021-03-20 ENCOUNTER — LAB ENCOUNTER (OUTPATIENT)
Dept: LAB | Facility: HOSPITAL | Age: 41
End: 2021-03-20
Attending: INTERNAL MEDICINE
Payer: COMMERCIAL

## 2021-03-20 DIAGNOSIS — E04.1 THYROID NODULE: ICD-10-CM

## 2021-03-20 DIAGNOSIS — E04.1 THYROID NODULE: Primary | ICD-10-CM

## 2021-03-20 LAB — SARS-COV-2 RNA RESP QL NAA+PROBE: NOT DETECTED

## 2021-03-23 ENCOUNTER — HOSPITAL ENCOUNTER (OUTPATIENT)
Dept: ULTRASOUND IMAGING | Facility: HOSPITAL | Age: 41
Discharge: HOME | End: 2021-03-23
Attending: INTERNAL MEDICINE
Payer: COMMERCIAL

## 2021-03-23 VITALS
DIASTOLIC BLOOD PRESSURE: 78 MMHG | HEART RATE: 79 BPM | SYSTOLIC BLOOD PRESSURE: 128 MMHG | HEIGHT: 66 IN | WEIGHT: 140 LBS | OXYGEN SATURATION: 100 % | BODY MASS INDEX: 22.5 KG/M2

## 2021-03-23 DIAGNOSIS — E04.1 THYROID NODULE: Primary | ICD-10-CM

## 2021-03-23 PROCEDURE — 88305 TISSUE EXAM BY PATHOLOGIST: CPT | Performed by: INTERNAL MEDICINE

## 2021-03-23 PROCEDURE — 88172 CYTP DX EVAL FNA 1ST EA SITE: CPT | Performed by: INTERNAL MEDICINE

## 2021-03-23 PROCEDURE — 88363 XM ARCHIVE TISSUE MOLEC ANAL: CPT | Performed by: INTERNAL MEDICINE

## 2021-03-23 PROCEDURE — 88177 CYTP FNA EVAL EA ADDL: CPT | Performed by: INTERNAL MEDICINE

## 2021-03-23 PROCEDURE — 10005 FNA BX W/US GDN 1ST LES: CPT | Performed by: INTERNAL MEDICINE

## 2021-03-23 PROCEDURE — 88173 CYTOPATH EVAL FNA REPORT: CPT | Performed by: INTERNAL MEDICINE

## 2021-03-23 NOTE — IMAGING NOTE
1407: Sal Meyer arrived in the ultrasound department. Identified with spelling of name and date of birth. Medications and allergies reviewed.   The following allergy was reported-dust   Sal Meyer denies the use of anticoagulating medications i

## 2021-03-24 ENCOUNTER — TELEPHONE (OUTPATIENT)
Dept: OTOLARYNGOLOGY | Facility: CLINIC | Age: 41
End: 2021-03-24

## 2021-03-24 NOTE — TELEPHONE ENCOUNTER
Rn received  A call from  Dr Eulalia Barnes of patient thyroid FNA result showed follicular lesion undetermined significant and asking if ok to send for molecular testing,Dr Dyer made aware and agreed with the plan.

## 2021-03-24 NOTE — TELEPHONE ENCOUNTER
Rn spoke to Darryl from pathology that it's fine with Dr Parikh April to send it for molecular testing.

## 2021-03-25 ENCOUNTER — TELEPHONE (OUTPATIENT)
Dept: INTERNAL MEDICINE CLINIC | Facility: CLINIC | Age: 41
End: 2021-03-25

## 2021-03-25 NOTE — TELEPHONE ENCOUNTER
Patient states she attempted to schedule an appointment with Dr. Kelly Atwood, but informed the first available opening was not until July. Patient states \"Dr. Delia Mendoza wanted me to call back if I can't get in right away. \"

## 2021-03-26 NOTE — TELEPHONE ENCOUNTER
This refill request is being sent to the provider for the following reason:  []Patient has not had an appointment within the past 12 months but has made an appointment on: ___  []Medication is not within protocol  [x]Patient did not complete follow up myriam

## 2021-03-29 ENCOUNTER — TELEPHONE (OUTPATIENT)
Dept: OTOLARYNGOLOGY | Facility: CLINIC | Age: 41
End: 2021-03-29

## 2021-03-29 RX ORDER — CELECOXIB 200 MG/1
200 CAPSULE ORAL DAILY PRN
Qty: 30 CAPSULE | Refills: 2 | Status: SHIPPED | OUTPATIENT
Start: 2021-03-29 | End: 2021-04-28

## 2021-03-29 NOTE — TELEPHONE ENCOUNTER
RX refill received from [facility] Celecoxib 200 mg capsule/ take 1 capsule (200mg) by mouth daily as needed for pain/     LOV:  2/25/21    Last Refill:  2/25/21

## 2021-03-29 NOTE — TELEPHONE ENCOUNTER
This refill request is being sent to the provider for the following reason:[][][x]Patient did not complete follow up recommendations,last visit on 2/25/21.

## 2021-03-30 ENCOUNTER — TELEPHONE (OUTPATIENT)
Dept: OTOLARYNGOLOGY | Facility: CLINIC | Age: 41
End: 2021-03-30

## 2021-03-30 RX ORDER — CELECOXIB 200 MG/1
CAPSULE ORAL
Qty: 90 CAPSULE | Refills: 0 | Status: SHIPPED | OUTPATIENT
Start: 2021-03-30 | End: 2021-10-07

## 2021-03-30 NOTE — TELEPHONE ENCOUNTER
Patient called clinic again, would like to speak with Dr. Marley Ventura about timing of appointment. Is it ok for her to see another provider or a NP?

## 2021-03-31 NOTE — TELEPHONE ENCOUNTER
Prabha Reyes MD  Em Ent Clinical Staff 18 hours ago (7:17 PM)     Please contact patient and let her know that her ultrasound-guided biopsy revealed some unusual cells therefore the specimen was sent for genetic testing.  We still have not received the

## 2021-04-01 ENCOUNTER — TELEPHONE (OUTPATIENT)
Dept: OTOLARYNGOLOGY | Facility: CLINIC | Age: 41
End: 2021-04-01

## 2021-04-01 ENCOUNTER — OFFICE VISIT (OUTPATIENT)
Dept: OTOLARYNGOLOGY | Facility: CLINIC | Age: 41
End: 2021-04-01

## 2021-04-01 ENCOUNTER — TELEPHONE (OUTPATIENT)
Dept: INTERNAL MEDICINE CLINIC | Facility: CLINIC | Age: 41
End: 2021-04-01

## 2021-04-01 VITALS
HEART RATE: 88 BPM | BODY MASS INDEX: 23 KG/M2 | WEIGHT: 140 LBS | DIASTOLIC BLOOD PRESSURE: 82 MMHG | SYSTOLIC BLOOD PRESSURE: 126 MMHG

## 2021-04-01 DIAGNOSIS — R07.0 THROAT PAIN: Primary | ICD-10-CM

## 2021-04-01 DIAGNOSIS — H92.01 RIGHT EAR PAIN: ICD-10-CM

## 2021-04-01 DIAGNOSIS — E04.1 THYROID NODULE: ICD-10-CM

## 2021-04-01 PROCEDURE — 3074F SYST BP LT 130 MM HG: CPT | Performed by: OTOLARYNGOLOGY

## 2021-04-01 PROCEDURE — 3079F DIAST BP 80-89 MM HG: CPT | Performed by: OTOLARYNGOLOGY

## 2021-04-01 PROCEDURE — 99215 OFFICE O/P EST HI 40 MIN: CPT | Performed by: OTOLARYNGOLOGY

## 2021-04-01 NOTE — TELEPHONE ENCOUNTER
----- Message from [facility].  Roxytif sent at 4/1/2021  9:47 AM CDT -----  Regarding: Non-Urgent Medical Question  Contact: [phone]  Hi Dr. Dada Merino to bother you but I'm having trouble swallowing and my right side of my neck hurts and my righ

## 2021-04-01 NOTE — TELEPHONE ENCOUNTER
Received a call from Dr Peyman Monzon pt stated had biopsy last week now complaining of neck pain swollen,pain radiates to pt ear and jaw,informed Dr Arthur Nielsen and scheduled pt today at 6 pm.

## 2021-04-01 NOTE — TELEPHONE ENCOUNTER
Spoke with pt,  verified, pt saw Dr. Andrae Rehman for some other issue on her cheek. Pt stated she had a mass on her thyroid, they did bx last Wednesday and she is stress out mentally, she can't sleep well.   Pt stated when her thyroid bx was done by [facility]

## 2021-04-01 NOTE — TELEPHONE ENCOUNTER
Spoke to patient, referred her to see ENT specialist, advised that she needs to be evaluated, Dr. Cathy Lundberg will see her today at 6 PM she agreed with the plan

## 2021-04-01 NOTE — TELEPHONE ENCOUNTER
Spoke to pt advised that she needs to be seen, made arrangements for her to see ENT specialist and she will see him today at 6 PM

## 2021-04-01 NOTE — TELEPHONE ENCOUNTER
Fax received from [facility] 159 records   Requested for insurance and billing requirements  All records proving necessity DOS 03/23/2021  PLEASE .[phone]  Form send to stat

## 2021-04-02 ENCOUNTER — TELEPHONE (OUTPATIENT)
Dept: OTOLARYNGOLOGY | Facility: CLINIC | Age: 41
End: 2021-04-02

## 2021-04-02 NOTE — TELEPHONE ENCOUNTER
LMTCB- pt will need to go to [address] [place] [address], cannot go to outside facility for US

## 2021-04-02 NOTE — PROGRESS NOTES
Tyrone Parmar is a 38year old female.   Patient presents with:  Throat Problem: per pt c/o of throat pain, started monday, per pt constant pain, pt had US Guided FNA on 3/23 for thyroid       HISTORY OF PRESENT ILLNESS  She presents with a nearly 1 yea use Celebrex in the future. She has been using cyclobenzaprine on occasion.       Social History    Socioeconomic History      Marital status:       Spouse name: Not on file      Number of children: Not on file      Years of education: Not on file HEMORRHOIDECTOMY  3-2015         REVIEW OF SYSTEMS    System Neg/Pos Details   Constitutional Negative Fatigue, fever and weight loss. ENMT Negative Drooling. Eyes Negative Blurred vision and vision changes. Respiratory Negative Dyspnea and wheezing. Tonsils - Normal. Oropharynx - Normal.   Nose/Mouth/Throat Normal Nares - Right: Normal Left: Normal. Septum -Normal  Turbinates - Right: Normal, Left: Normal.       Current Outpatient Medications:   •  cyclobenzaprine 5 MG Oral Tab, Take 1 tablet (5 mg to right now I did recommend an ultrasound to make sure that there is in some kind of fluid or blood collection. Infection unlikely but I will contact her with the results of her ultrasound.   In addition I have asked her to resume the use of her Celebrex and discrepancies may still exist    Cecil Fisher MD    4/1/2021    8:20 PM

## 2021-04-03 ENCOUNTER — HOSPITAL ENCOUNTER (OUTPATIENT)
Dept: ULTRASOUND IMAGING | Age: 41
Discharge: HOME | End: 2021-04-03
Attending: OTOLARYNGOLOGY
Payer: COMMERCIAL

## 2021-04-03 DIAGNOSIS — R07.0 THROAT PAIN: ICD-10-CM

## 2021-04-03 PROCEDURE — 76536 US EXAM OF HEAD AND NECK: CPT | Performed by: OTOLARYNGOLOGY

## 2021-04-05 NOTE — TELEPHONE ENCOUNTER
Reviewed with patient biopsy results, advised that more results are pending sent to molecular testing, ENT office probably will notify her about test results in few days, also advised her to see endocrinologist for opinion.       THYGENX WITH REFLEX TO THYR

## 2021-04-06 NOTE — TELEPHONE ENCOUNTER
Per Lashawn Karimi in [address] [place] [address] pathology lab. She follow up on this specimen. Today is day 8, the usual turn around is 10-14 days. She was told to expect results by early next week. [employer] message sent to patient with this expectation.

## 2021-04-08 ENCOUNTER — TELEPHONE (OUTPATIENT)
Dept: INTERNAL MEDICINE CLINIC | Facility: CLINIC | Age: 41
End: 2021-04-08

## 2021-04-08 NOTE — TELEPHONE ENCOUNTER
Can you see this patient soon for a consult, thyroid nodule undetermined significance possible follicular neoplasm she is very anxious about her condition

## 2021-04-08 NOTE — TELEPHONE ENCOUNTER
Endo staff please call to schedule patient in next 1-2 weeks with Dr. Sabino Keyes. Let me know if she doesn't have any availability. Thanks.

## 2021-04-08 NOTE — TELEPHONE ENCOUNTER
Spoke to patient, she is aware that the additional testing results are back, I will try to facilitate your appointment with endocrinologist.

## 2021-04-09 ENCOUNTER — OFFICE VISIT (OUTPATIENT)
Dept: OTOLARYNGOLOGY | Facility: CLINIC | Age: 41
End: 2021-04-09

## 2021-04-09 ENCOUNTER — LAB ENCOUNTER (OUTPATIENT)
Dept: LAB | Facility: HOSPITAL | Age: 41
End: 2021-04-09
Attending: OTOLARYNGOLOGY
Payer: COMMERCIAL

## 2021-04-09 VITALS
BODY MASS INDEX: 21.69 KG/M2 | HEIGHT: 66 IN | SYSTOLIC BLOOD PRESSURE: 106 MMHG | TEMPERATURE: 98 F | DIASTOLIC BLOOD PRESSURE: 73 MMHG | WEIGHT: 135 LBS

## 2021-04-09 DIAGNOSIS — E04.1 THYROID NODULE: Primary | ICD-10-CM

## 2021-04-09 DIAGNOSIS — E04.1 THYROID NODULE: ICD-10-CM

## 2021-04-09 PROCEDURE — 3074F SYST BP LT 130 MM HG: CPT | Performed by: OTOLARYNGOLOGY

## 2021-04-09 PROCEDURE — 3008F BODY MASS INDEX DOCD: CPT | Performed by: OTOLARYNGOLOGY

## 2021-04-09 PROCEDURE — 84481 FREE ASSAY (FT-3): CPT

## 2021-04-09 PROCEDURE — 84443 ASSAY THYROID STIM HORMONE: CPT

## 2021-04-09 PROCEDURE — 3078F DIAST BP <80 MM HG: CPT | Performed by: OTOLARYNGOLOGY

## 2021-04-09 PROCEDURE — 99214 OFFICE O/P EST MOD 30 MIN: CPT | Performed by: OTOLARYNGOLOGY

## 2021-04-09 PROCEDURE — 84436 ASSAY OF TOTAL THYROXINE: CPT

## 2021-04-09 PROCEDURE — 36415 COLL VENOUS BLD VENIPUNCTURE: CPT

## 2021-04-09 NOTE — PROGRESS NOTES
Stepan Nathan is a 38year old female. Patient presents with:  Results: us thyroid 04/01. pt is feeling well today.       HISTORY OF PRESENT ILLNESS  She presents with a nearly 1 year history of feeling discomfort in her right cheek extending towards h occasion.      4/9/21 last visit she is very concerned about possible bleeding in her neck. Ultrasound demonstrated no other maladies. She was started on Flexeril and Celebrex and noted complete resolution of her neck discomfort.   Here to discuss results intolerance and heat intolerance. Neuro Negative Tremors. Psych Negative Anxiety and depression. Integumentary Negative Frequent skin infections, pigment change and rash. Hema/Lymph Negative Easy bleeding and easy bruising.            PHYSICAL EXAM Pain., Disp: 30 capsule, Rfl: 2  •  cyclobenzaprine 5 MG Oral Tab, Take 1 tablet (5 mg total) by mouth nightly., Disp: 30 tablet, Rfl: 1  •  Hydrocortisone (ANUSOL-HC) 2.5 % External Cream, Place 1 Application rectally 2 (two) times daily. , Disp: 1 Tube, R dictation discrepancies may still exist    Cecil Oliveira MD    4/9/2021    6:09 PM

## 2021-04-13 NOTE — H&P
Name: Douglas Meyer  Date: 4/13/2021    Referring Physician: No ref. provider found    Patient presents with:  Consult: PCP referred for thyroid nodules. Was getting a CT scan for her cheek and the nodiel was found.        HISTORY OF PRESENT ILLNESS   A Oral Cap, Take 1 capsule (200 mg total) by mouth daily as needed for Pain., Disp: 30 capsule, Rfl: 2  •  cyclobenzaprine 5 MG Oral Tab, Take 1 tablet (5 mg total) by mouth nightly., Disp: 30 tablet, Rfl: 1  •  Hydrocortisone (ANUSOL-HC) 2.5 % External Crea conjunctivae, sclera.   Ears/Nose/Mouth/Throat/Neck:  normal hearing, normal speech and normal thyroid gland  Neck: Trachea midline  Neurologic: sensory grossly intact and motor grossly intact  Respiratory:  clear to auscultation bilaterally  Cardiovascular answered to the best of my knowledge. 4/14/2021  Rasheeda Doyle MD

## 2021-04-14 ENCOUNTER — OFFICE VISIT (OUTPATIENT)
Dept: ENDOCRINOLOGY CLINIC | Facility: CLINIC | Age: 41
End: 2021-04-14

## 2021-04-14 ENCOUNTER — TELEPHONE (OUTPATIENT)
Dept: OTOLARYNGOLOGY | Facility: CLINIC | Age: 41
End: 2021-04-14

## 2021-04-14 VITALS
HEIGHT: 66 IN | WEIGHT: 136 LBS | HEART RATE: 61 BPM | DIASTOLIC BLOOD PRESSURE: 76 MMHG | BODY MASS INDEX: 21.86 KG/M2 | SYSTOLIC BLOOD PRESSURE: 106 MMHG | RESPIRATION RATE: 18 BRPM

## 2021-04-14 DIAGNOSIS — E04.1 THYROID NODULE: Primary | ICD-10-CM

## 2021-04-14 PROCEDURE — 3074F SYST BP LT 130 MM HG: CPT | Performed by: INTERNAL MEDICINE

## 2021-04-14 PROCEDURE — 99245 OFF/OP CONSLTJ NEW/EST HI 55: CPT | Performed by: INTERNAL MEDICINE

## 2021-04-14 PROCEDURE — 3008F BODY MASS INDEX DOCD: CPT | Performed by: INTERNAL MEDICINE

## 2021-04-14 PROCEDURE — 3078F DIAST BP <80 MM HG: CPT | Performed by: INTERNAL MEDICINE

## 2021-04-14 NOTE — TELEPHONE ENCOUNTER
Connor Medina MD  P  Ent Clinical Staff  Please contact patient to set up right hemithyroidectomy 1 hour with Dr. Leocadia Mcburney to assist sometime in late May or June when she is done with work.  This to be performed at [facility]

## 2021-04-15 ENCOUNTER — TELEPHONE (OUTPATIENT)
Dept: ENDOCRINOLOGY CLINIC | Facility: CLINIC | Age: 41
End: 2021-04-15

## 2021-04-15 DIAGNOSIS — E04.1 THYROID NODULE: Primary | ICD-10-CM

## 2021-04-15 RX ORDER — HYDROCORTISONE 25 MG/G
1 CREAM TOPICAL 2 TIMES DAILY
Qty: 1 TUBE | Refills: 0 | Status: SHIPPED | OUTPATIENT
Start: 2021-04-15 | End: 2021-10-07

## 2021-04-15 NOTE — TELEPHONE ENCOUNTER
Behzad Craven! I saw Ms. Meyer today in consultation regarding her recent FNAof her thyroid nodule. I wanted to ask you about your evaluation of the aspirate from the recent FNA. You mention that Hurthle cell neoplasm cannot be ruled out.  Are yo

## 2021-04-16 RX ORDER — NORGESTREL-ETHINYL ESTRADIOL 0.3-0.03MG
TABLET ORAL
Qty: 84 TABLET | Refills: 0 | Status: SHIPPED | OUTPATIENT
Start: 2021-04-16 | End: 2021-10-07

## 2021-04-22 NOTE — TELEPHONE ENCOUNTER
Hi Dr. Joanna Cowan    Pt is inquiring about this. ..see  message 4/21, were you able to get in touch with Dr. Valentina Webb.  Please advise

## 2021-04-22 NOTE — TELEPHONE ENCOUNTER
Hi!  I just talked to the pathologist. I'm sorry, I was waiting for him to get back to me. Please ask her when is a good time for me to call her tomorrow. Thank you!

## 2021-04-22 NOTE — TELEPHONE ENCOUNTER
Dr. Antonella Vogel to patient to relay message below - patient stated 9am works - she will be expecting your call at [address] [place] [address] ([phone])  Thanks

## 2021-04-23 NOTE — TELEPHONE ENCOUNTER
Hi!  I called this patient and explained to her what I had spoken to Dr. Kris Carr about. Her FNA showed 'follicular lesion of undetermined significance'.  The genetic testing was negative and therefore I have advised the patient to have a repeat thyroid US do

## 2021-06-17 NOTE — TELEPHONE ENCOUNTER
Per , if pt unsure of surgery pt can hold off on surgery and repeat US in October.  Pt verbalized understanding

## 2021-07-01 ENCOUNTER — HOSPITAL ENCOUNTER (EMERGENCY)
Facility: HOSPITAL | Age: 41
Discharge: HOME | End: 2021-07-01
Attending: EMERGENCY MEDICINE
Payer: COMMERCIAL

## 2021-07-01 ENCOUNTER — APPOINTMENT (OUTPATIENT)
Dept: GENERAL RADIOLOGY | Facility: HOSPITAL | Age: 41
End: 2021-07-01
Attending: EMERGENCY MEDICINE
Payer: COMMERCIAL

## 2021-07-01 VITALS
HEART RATE: 79 BPM | TEMPERATURE: 99 F | DIASTOLIC BLOOD PRESSURE: 79 MMHG | RESPIRATION RATE: 18 BRPM | SYSTOLIC BLOOD PRESSURE: 111 MMHG | OXYGEN SATURATION: 99 %

## 2021-07-01 DIAGNOSIS — R53.1 WEAKNESS GENERALIZED: Primary | ICD-10-CM

## 2021-07-01 LAB
BILIRUB UR QL: NEGATIVE
CLARITY UR: CLEAR
COLOR UR: YELLOW
GLUCOSE UR-MCNC: NEGATIVE MG/DL
KETONES UR-MCNC: NEGATIVE MG/DL
LEUKOCYTE ESTERASE UR QL STRIP.AUTO: NEGATIVE
NITRITE UR QL STRIP.AUTO: NEGATIVE
PH UR: 7 [PH] (ref 5–8)
PROT UR-MCNC: NEGATIVE MG/DL
SARS-COV-2 RNA RESP QL NAA+PROBE: NOT DETECTED
SP GR UR STRIP: 1 (ref 1–1.03)
UROBILINOGEN UR STRIP-ACNC: <2

## 2021-07-01 PROCEDURE — 93010 ELECTROCARDIOGRAM REPORT: CPT | Performed by: EMERGENCY MEDICINE

## 2021-07-01 PROCEDURE — 96374 THER/PROPH/DIAG INJ IV PUSH: CPT

## 2021-07-01 PROCEDURE — 99284 EMERGENCY DEPT VISIT MOD MDM: CPT

## 2021-07-01 PROCEDURE — 71045 X-RAY EXAM CHEST 1 VIEW: CPT | Performed by: EMERGENCY MEDICINE

## 2021-07-01 PROCEDURE — 93005 ELECTROCARDIOGRAM TRACING: CPT

## 2021-07-01 PROCEDURE — 81001 URINALYSIS AUTO W/SCOPE: CPT | Performed by: EMERGENCY MEDICINE

## 2021-07-01 RX ORDER — FAMOTIDINE 20 MG/1
20 TABLET ORAL 2 TIMES DAILY PRN
Qty: 30 TABLET | Refills: 0 | Status: SHIPPED | OUTPATIENT
Start: 2021-07-01 | End: 2021-07-31

## 2021-07-01 RX ORDER — KETOROLAC TROMETHAMINE 15 MG/ML
15 INJECTION, SOLUTION INTRAMUSCULAR; INTRAVENOUS ONCE
Status: COMPLETED | OUTPATIENT
Start: 2021-07-01 | End: 2021-07-01

## 2021-07-01 NOTE — ED INITIAL ASSESSMENT (HPI)
Cough/sneezing began Sunday with cough now resolved but today is fatigued with body aches, chest and abd pain, dizziness and SOB. Pt denies fevers. Pt is not vaccinated against Covid.

## 2021-07-01 NOTE — ED QUICK NOTES
Pt arrived c/o generalized weakness, diarhea, left sided chest pain, and SOB. Onset Sunday. Denies fevers. Pt states had Covid April 2020. Denies sick contacts.

## 2021-07-01 NOTE — ED PROVIDER NOTES
Patient Seen in: [facility] AND [facility] Emergency Department      History   Patient presents with:  Chest Pain Angina  Body ache and/or chills  Difficulty Breathing    Stated Complaint: Chest Pain    HPI/Subjective:   HPI    25-year-old female presents for ev Normal heart sounds. Pulmonary:      Effort: Pulmonary effort is normal. No respiratory distress. Breath sounds: Normal breath sounds. Abdominal:      General: Bowel sounds are normal. There is no distension. Palpations: Abdomen is soft. OTHER: Negative.                          =====    CONCLUSION:          No evidence for acute cardiopulmonary process.                    Dictated by (CST): Tony Henry MD on 7/01/2021 at 6:07 PM         Finalized by (CST): Tony Henry MD on 7/01/2021 a agreement with this plan. Further Outpatient evaluation and treatment will be required.  I personally discussed the results of the above ED workup and a number of associated acute management issues with the patient, and I explained the need for further f

## 2021-07-27 ENCOUNTER — NURSE TRIAGE (OUTPATIENT)
Dept: INTERNAL MEDICINE CLINIC | Facility: CLINIC | Age: 41
End: 2021-07-27

## 2021-07-27 DIAGNOSIS — Z12.31 ENCOUNTER FOR MAMMOGRAM TO ESTABLISH BASELINE MAMMOGRAM: Primary | ICD-10-CM

## 2021-07-27 NOTE — TELEPHONE ENCOUNTER
Action Requested: Summary for Provider     []  Critical Lab, Recommendations Needed  [] Need Additional Advice  []   FYI    []   Need Orders  [] Need Medications Sent to Pharmacy  []  Other     SUMMARY: Patient c/o rectal bleeding for about 1 month or so t

## 2021-07-30 ENCOUNTER — HOSPITAL ENCOUNTER (OUTPATIENT)
Dept: MAMMOGRAPHY | Age: 41
Discharge: HOME | End: 2021-07-30
Attending: INTERNAL MEDICINE
Payer: COMMERCIAL

## 2021-07-30 DIAGNOSIS — Z12.31 ENCOUNTER FOR MAMMOGRAM TO ESTABLISH BASELINE MAMMOGRAM: ICD-10-CM

## 2021-07-30 PROCEDURE — 77063 BREAST TOMOSYNTHESIS BI: CPT | Performed by: INTERNAL MEDICINE

## 2021-07-30 PROCEDURE — 77067 SCR MAMMO BI INCL CAD: CPT | Performed by: INTERNAL MEDICINE

## 2021-08-27 ENCOUNTER — TELEPHONE (OUTPATIENT)
Dept: OBGYN CLINIC | Facility: CLINIC | Age: 41
End: 2021-08-27

## 2021-08-27 NOTE — TELEPHONE ENCOUNTER
lmp 7/24, about 5-weeks pregnant, bleeding    Pt willing to see another provide after 4pm (mon thru Friday)  Or Saturday.     Please advise

## 2021-08-29 ENCOUNTER — HOSPITAL ENCOUNTER (OUTPATIENT)
Age: 41
Discharge: HOME | End: 2021-08-29
Attending: EMERGENCY MEDICINE
Payer: COMMERCIAL

## 2021-08-29 VITALS
TEMPERATURE: 98 F | SYSTOLIC BLOOD PRESSURE: 109 MMHG | OXYGEN SATURATION: 100 % | RESPIRATION RATE: 18 BRPM | DIASTOLIC BLOOD PRESSURE: 64 MMHG | HEART RATE: 74 BPM

## 2021-08-29 DIAGNOSIS — J02.9 VIRAL PHARYNGITIS: Primary | ICD-10-CM

## 2021-08-29 LAB — S PYO AG THROAT QL: NEGATIVE

## 2021-08-29 PROCEDURE — 87880 STREP A ASSAY W/OPTIC: CPT

## 2021-08-29 PROCEDURE — 99213 OFFICE O/P EST LOW 20 MIN: CPT

## 2021-08-29 NOTE — ED INITIAL ASSESSMENT (HPI)
PATIENT ARRIVED AMBULATORY TO ROOM C/O SYMPTOMS THAT STARTED YESTERDAY. +SORE THROAT. SLIGHT COUGH. NO FEVERS. +FATIGUE.

## 2021-08-30 LAB — SARS-COV-2 RNA RESP QL NAA+PROBE: NOT DETECTED

## 2021-09-06 NOTE — ED PROVIDER NOTES
Patient Seen in: [facility]      History   Patient presents with:  Sore Throat    Stated Complaint: SORE THROAT/BODY ACHE    HPI/Subjective:   HPI    Sore throat for two days. Also c/o fatigue. No fever. Has slight cough.      Objective:   His General: Skin is warm and dry. Capillary Refill: Capillary refill takes less than 2 seconds. Neurological:      General: No focal deficit present. Mental Status: She is alert. Sensory: No sensory deficit.    Psychiatric:         Mood and A

## 2021-09-12 ENCOUNTER — HOSPITAL ENCOUNTER (OUTPATIENT)
Dept: ULTRASOUND IMAGING | Age: 41
Discharge: HOME | End: 2021-09-12
Attending: INTERNAL MEDICINE
Payer: COMMERCIAL

## 2021-09-12 DIAGNOSIS — E04.1 THYROID NODULE: ICD-10-CM

## 2021-09-12 PROCEDURE — 76536 US EXAM OF HEAD AND NECK: CPT | Performed by: INTERNAL MEDICINE

## 2021-09-15 ENCOUNTER — TELEPHONE (OUTPATIENT)
Dept: ENDOCRINOLOGY CLINIC | Facility: CLINIC | Age: 41
End: 2021-09-15

## 2021-09-15 DIAGNOSIS — E04.1 THYROID NODULE: Primary | ICD-10-CM

## 2021-09-16 NOTE — TELEPHONE ENCOUNTER
Hi!  Please let the patient know that the size of the nodule has reduced in size, and has become mostly solid, and therefore I think it would be easier to biopsy. I still recommend that she undergo an FNA at this time. Its greater than 1 cm and hypoechoic.

## 2021-09-17 NOTE — TELEPHONE ENCOUNTER
Spoke to patient regarding Dr. Theodora Castano notes below. Patient verbalized understanding, and stated she will schedule the FNA biopsy.

## 2021-09-26 ENCOUNTER — PATIENT MESSAGE (OUTPATIENT)
Dept: ENDOCRINOLOGY CLINIC | Facility: CLINIC | Age: 41
End: 2021-09-26

## 2021-09-27 ENCOUNTER — PATIENT MESSAGE (OUTPATIENT)
Dept: ENDOCRINOLOGY CLINIC | Facility: CLINIC | Age: 41
End: 2021-09-27

## 2021-09-27 NOTE — TELEPHONE ENCOUNTER
Order for FNA guided biopsy placed on 9/16 per provider. Gave patient information via [employer] to schedule.

## 2021-09-27 NOTE — TELEPHONE ENCOUNTER
From: Sal Meyer  To: Rasheeda Garcia MD  Sent: 9/26/2021 10:03 AM CDT  Subject: Biopsy    Hello,    The nurse from your office called and said that you would like me to get another biopsy.  I have not received the order for that test. Please pl

## 2021-09-27 NOTE — TELEPHONE ENCOUNTER
Replied to patient's "[employer]t message with details regarding the FNA biopsy. See previous encounters.

## 2021-09-28 ENCOUNTER — TELEMEDICINE (OUTPATIENT)
Dept: INTERNAL MEDICINE CLINIC | Facility: CLINIC | Age: 41
End: 2021-09-28

## 2021-09-28 DIAGNOSIS — J02.9 SORE THROAT: ICD-10-CM

## 2021-09-28 DIAGNOSIS — N89.8 VAGINAL DISCHARGE: Primary | ICD-10-CM

## 2021-09-28 PROCEDURE — 99213 OFFICE O/P EST LOW 20 MIN: CPT | Performed by: NURSE PRACTITIONER

## 2021-09-28 NOTE — PROGRESS NOTES
Video Check-In    Sal Meyer verbally consents to a Virtual/Telephone Check-In visit on 09/28/21. Patient understands and accepts financial responsibility for any deductible, co-insurance and/or co-pays associated with this service.     Carrie Huang discussed: Carlin LITTLE, spoke with pt. Pt presents for virtual visits w/ c/o Green vaginal discharge and spouse w/ genital warts. Spouse advised pt has warts on Penis had 27 years ago when he was 16y.o. and warts were frozen off.  Selvin Youssef sees vaginal discharge was green (yesterday). Nothing aggravates the symptoms. She has tried nothing for the symptoms. The treatment provided no relief. She is sexually active. Yes (Genital warts), her partner has an STD.        Review of Systems:    General: Leotha Leyden decision making. Appropriate medical decision making and tests are ordered as detailed in the plan of care above.     ANABEL Irby

## 2021-10-11 ENCOUNTER — TELEMEDICINE (OUTPATIENT)
Dept: INTERNAL MEDICINE CLINIC | Facility: CLINIC | Age: 41
End: 2021-10-11

## 2021-10-11 DIAGNOSIS — K62.5 RECTAL BLEEDING: Primary | ICD-10-CM

## 2021-10-11 DIAGNOSIS — K60.2 RECTAL FISSURE: ICD-10-CM

## 2021-10-11 PROCEDURE — 99212 OFFICE O/P EST SF 10 MIN: CPT | Performed by: INTERNAL MEDICINE

## 2021-10-11 NOTE — PROGRESS NOTES
Telemedicine Note    Virtual Video Check-In    Sal Meyer verbally consents to a Virtual Video Check-In service on 10/11/21.  Patient understands and accepts financial responsibility for any deductible, co-insurance and/or co-pays associated with [facility] NEURO: negative for headache and dizziness  PSYCHE: negative for depression or anxiety symptoms    ALL/ASTHMA: negative for allergy symptoms     Physical Exam  Patient alert and oriented x3  Patient does not appear in any respiratory distress during the

## 2021-10-14 ENCOUNTER — HOSPITAL ENCOUNTER (OUTPATIENT)
Age: 41
Discharge: HOME | End: 2021-10-14
Payer: COMMERCIAL

## 2021-10-14 VITALS
DIASTOLIC BLOOD PRESSURE: 63 MMHG | TEMPERATURE: 98 F | OXYGEN SATURATION: 100 % | HEART RATE: 68 BPM | SYSTOLIC BLOOD PRESSURE: 115 MMHG | RESPIRATION RATE: 18 BRPM

## 2021-10-14 DIAGNOSIS — J02.0 STREPTOCOCCAL SORE THROAT: Primary | ICD-10-CM

## 2021-10-14 PROCEDURE — 87880 STREP A ASSAY W/OPTIC: CPT

## 2021-10-14 PROCEDURE — 99213 OFFICE O/P EST LOW 20 MIN: CPT

## 2021-10-14 PROCEDURE — 99214 OFFICE O/P EST MOD 30 MIN: CPT

## 2021-10-14 RX ORDER — AMOXICILLIN 875 MG/1
875 TABLET, COATED ORAL 2 TIMES DAILY
Qty: 20 TABLET | Refills: 0 | Status: SHIPPED | OUTPATIENT
Start: 2021-10-14 | End: 2021-10-24

## 2021-10-14 RX ORDER — FLUCONAZOLE 150 MG/1
150 TABLET ORAL ONCE
Qty: 2 TABLET | Refills: 0 | Status: SHIPPED | OUTPATIENT
Start: 2021-10-14 | End: 2021-10-14

## 2021-10-14 NOTE — ED INITIAL ASSESSMENT (HPI)
PATIENT ARRIVED AMBULATORY TO ROOM C/O SYMPTOMS THAT STARTED 2 DAYS AGO. +SORE THROAT. +BILATERAL EAR PAIN. LEFT WORSE THAN THE RIGHT. NO FEVERS.

## 2021-10-14 NOTE — ED PROVIDER NOTES
Patient Seen in: [facility]      History   Patient presents with:  Sore Throat    Stated Complaint: ear pain - both ears, sore throat    Subjective:   Sal Meyer is a 42year-old female who presents to the [facility] complaining of and are negative. Positive for stated complaint: ear pain - both ears, sore throat  Other systems are as noted in HPI. Constitutional and vital signs reviewed. All other systems reviewed and negative except as noted above.     Physical Exam     E Judgment: Judgment normal.           ED Course     Labs Reviewed   POCT RAPID STREP - Abnormal; Notable for the following components:       Result Value    POCT Rapid Strep Positive (*)     All other components within normal limits   RAPID SARS-COV-2 taking these medications    amoxicillin 875 MG Oral Tab  Take 1 tablet (875 mg total) by mouth 2 (two) times daily for 10 days. , Normal, Disp-20 tablet, R-0    fluconazole (DIFLUCAN) 150 MG Oral Tab  Take 1 tablet (150 mg total) by mouth once for 1 dose.  Roby Simmonds

## 2021-10-20 ENCOUNTER — OFFICE VISIT (OUTPATIENT)
Dept: SURGERY | Facility: CLINIC | Age: 41
End: 2021-10-20

## 2021-10-20 VITALS — BODY MASS INDEX: 23 KG/M2 | WEIGHT: 140 LBS

## 2021-10-20 DIAGNOSIS — K60.2 ANAL FISSURE: ICD-10-CM

## 2021-10-20 DIAGNOSIS — K64.8 INTERNAL HEMORRHOIDS WITH COMPLICATION: Primary | ICD-10-CM

## 2021-10-20 PROCEDURE — 46600 DIAGNOSTIC ANOSCOPY SPX: CPT | Performed by: SURGERY

## 2021-10-20 PROCEDURE — 99244 OFF/OP CNSLTJ NEW/EST MOD 40: CPT | Performed by: SURGERY

## 2021-10-20 NOTE — PROGRESS NOTES
History and Physical      Sal Meyer is a 42year old female. HPI   Patient presents with:  Anal / Rectal Problem: Pt referred by Dr. Leonela Jackson regarding anal fissure. Pt states she has had bleeding and pain over the last 3 yrs.   Pt states she ha retired      Occupation: English,         Employer: [id number] [employer]    Tobacco Use      Smoking status: Current Every Day Smoker        Packs/day: 0.00      Smokeless tobacco: Never Used      Tobacco comment: 2 cigs/ day     Vaping Use tolerated the procedure well.      DIAGNOSTICS    ASSESSMENT/PLAN   Assessment   Internal hemorrhoids with complication  (primary encounter diagnosis)  Anal fissure    42year old female with probable intermittent symptomatic hemorrhoid disease and more chr

## 2021-12-17 ENCOUNTER — APPOINTMENT (OUTPATIENT)
Dept: CT IMAGING | Facility: HOSPITAL | Age: 41
End: 2021-12-17
Attending: EMERGENCY MEDICINE
Payer: COMMERCIAL

## 2021-12-17 ENCOUNTER — HOSPITAL ENCOUNTER (EMERGENCY)
Facility: HOSPITAL | Age: 41
Discharge: HOME | End: 2021-12-17
Attending: EMERGENCY MEDICINE
Payer: COMMERCIAL

## 2021-12-17 VITALS
HEIGHT: 66 IN | SYSTOLIC BLOOD PRESSURE: 113 MMHG | WEIGHT: 140 LBS | OXYGEN SATURATION: 100 % | BODY MASS INDEX: 22.5 KG/M2 | DIASTOLIC BLOOD PRESSURE: 71 MMHG | HEART RATE: 74 BPM | TEMPERATURE: 97 F | RESPIRATION RATE: 16 BRPM

## 2021-12-17 DIAGNOSIS — R10.84 GENERALIZED ABDOMINAL PAIN: Primary | ICD-10-CM

## 2021-12-17 PROCEDURE — 99284 EMERGENCY DEPT VISIT MOD MDM: CPT

## 2021-12-17 PROCEDURE — 81025 URINE PREGNANCY TEST: CPT

## 2021-12-17 PROCEDURE — 80048 BASIC METABOLIC PNL TOTAL CA: CPT | Performed by: EMERGENCY MEDICINE

## 2021-12-17 PROCEDURE — 81003 URINALYSIS AUTO W/O SCOPE: CPT

## 2021-12-17 PROCEDURE — 74177 CT ABD & PELVIS W/CONTRAST: CPT | Performed by: EMERGENCY MEDICINE

## 2021-12-17 PROCEDURE — 81003 URINALYSIS AUTO W/O SCOPE: CPT | Performed by: EMERGENCY MEDICINE

## 2021-12-17 PROCEDURE — 85025 COMPLETE CBC W/AUTO DIFF WBC: CPT | Performed by: EMERGENCY MEDICINE

## 2021-12-17 PROCEDURE — 85025 COMPLETE CBC W/AUTO DIFF WBC: CPT

## 2021-12-17 PROCEDURE — 80048 BASIC METABOLIC PNL TOTAL CA: CPT

## 2021-12-17 PROCEDURE — 96374 THER/PROPH/DIAG INJ IV PUSH: CPT

## 2021-12-17 RX ORDER — DICYCLOMINE HCL 20 MG
20 TABLET ORAL 4 TIMES DAILY PRN
Qty: 30 TABLET | Refills: 0 | Status: SHIPPED | OUTPATIENT
Start: 2021-12-17

## 2021-12-17 RX ORDER — KETOROLAC TROMETHAMINE 15 MG/ML
15 INJECTION, SOLUTION INTRAMUSCULAR; INTRAVENOUS ONCE
Status: COMPLETED | OUTPATIENT
Start: 2021-12-17 | End: 2021-12-17

## 2021-12-17 RX ORDER — FAMOTIDINE 20 MG
20 TABLET ORAL 2 TIMES DAILY
Qty: 60 TABLET | Refills: 0 | Status: SHIPPED | OUTPATIENT
Start: 2021-12-17 | End: 2022-01-16

## 2021-12-18 NOTE — ED PROVIDER NOTES
Patient Seen in: [facility] AND [facility] Emergency Department    History   Patient presents with:  Abdomen/Flank Pain      HPI    The patient presents to the ED complaining of lower abdominal pain and bloating for the past 10 days with associated epigastric ab today, pertinent positives to the presenting problem noted.     Physical Exam     ED Triage Vitals [12/17/21 1506]   /71   Pulse 74   Resp 16   Temp 97.3 °F (36.3 °C)   Temp src Temporal   SpO2 100 %   O2 Device None (Room air)       All measures to p BUN/CREA Ratio 7.4 (*)     Calcium, Total 10.3 (*)     All other components within normal limits   CBC W/ DIFFERENTIAL - Abnormal; Notable for the following components:    WBC 11.3 (*)     RBC 5.36 (*)     RDW-SD 46.5 (*)     RDW 15.3 (*)     Neutrophil Ab Intravenous Given 12/17/21 1751)   iopamidol (ISOVUE-370) 76 % injection 100 mL (100 mL Intravenous Given 12/17/21 1816)         MDM      12/17/21  1506 12/17/21  1903   BP: 113/71    Pulse: 74    Resp: 16 16   Temp: 97.3 °F (36.3 °C)    TempSrc: Temporal 20 MG Oral Tab  Take 1 tablet (20 mg total) by mouth 4 (four) times daily as needed (Abdominal pain). , Normal, Disp-30 tablet, R-0    PEPCID 20 MG Oral Tab  Take 1 tablet (20 mg total) by mouth 2 (two) times daily. , Normal, Disp-60 tablet, R-0, JAYCE

## 2022-01-01 ENCOUNTER — LAB ENCOUNTER (OUTPATIENT)
Dept: LAB | Facility: HOSPITAL | Age: 42
End: 2022-01-01
Attending: INTERNAL MEDICINE
Payer: COMMERCIAL

## 2022-01-01 DIAGNOSIS — E04.1 THYROID NODULE: ICD-10-CM

## 2022-01-01 LAB — SARS-COV-2 RNA RESP QL NAA+PROBE: NOT DETECTED

## 2022-01-04 ENCOUNTER — HOSPITAL ENCOUNTER (OUTPATIENT)
Dept: ULTRASOUND IMAGING | Facility: HOSPITAL | Age: 42
Discharge: HOME | End: 2022-01-04
Attending: INTERNAL MEDICINE
Payer: COMMERCIAL

## 2022-01-04 DIAGNOSIS — E04.1 THYROID NODULE: ICD-10-CM

## 2022-01-04 PROCEDURE — 88173 CYTOPATH EVAL FNA REPORT: CPT | Performed by: INTERNAL MEDICINE

## 2022-01-04 PROCEDURE — 88172 CYTP DX EVAL FNA 1ST EA SITE: CPT | Performed by: INTERNAL MEDICINE

## 2022-01-04 PROCEDURE — 10005 FNA BX W/US GDN 1ST LES: CPT | Performed by: INTERNAL MEDICINE

## 2022-01-04 PROCEDURE — 88177 CYTP FNA EVAL EA ADDL: CPT | Performed by: INTERNAL MEDICINE

## 2022-01-04 NOTE — IMAGING NOTE
1429  Pt arrived to ultrasound room #2    1437 Scans taken by Sondra Driscoll, ultrasound  sonographer     6061 Procedure explained questions answered. Consent verified and obtained  History taken and as follows: Prior FNA inconclusive, nodule solidified/changed.

## 2022-01-05 NOTE — TELEPHONE ENCOUNTER
Hi!    Can we please call this patient and let her know that I have reviewed her thyroid nodule FNA and the result was benign.  I would recommend for her to have a repeat thyroid US in 1 year and follow-up with me in the clinic at that time so that we can g

## 2022-02-01 ENCOUNTER — LAB ENCOUNTER (OUTPATIENT)
Dept: LAB | Age: 42
End: 2022-02-01
Attending: HOSPITALIST
Payer: COMMERCIAL

## 2022-02-14 ENCOUNTER — LAB ENCOUNTER (OUTPATIENT)
Dept: LAB | Facility: HOSPITAL | Age: 42
End: 2022-02-14
Attending: INTERNAL MEDICINE
Payer: COMMERCIAL

## 2022-02-14 ENCOUNTER — OFFICE VISIT (OUTPATIENT)
Dept: ENDOCRINOLOGY CLINIC | Facility: CLINIC | Age: 42
End: 2022-02-14
Payer: COMMERCIAL

## 2022-02-14 VITALS
DIASTOLIC BLOOD PRESSURE: 67 MMHG | SYSTOLIC BLOOD PRESSURE: 99 MMHG | WEIGHT: 144.81 LBS | BODY MASS INDEX: 23 KG/M2 | HEART RATE: 71 BPM

## 2022-02-14 DIAGNOSIS — E04.1 THYROID NODULE: ICD-10-CM

## 2022-02-14 DIAGNOSIS — O20.9 VAGINAL BLEEDING AFFECTING EARLY PREGNANCY: ICD-10-CM

## 2022-02-14 DIAGNOSIS — E04.1 THYROID NODULE: Primary | ICD-10-CM

## 2022-02-14 DIAGNOSIS — O20.9 VAGINAL BLEEDING IN PREGNANCY, FIRST TRIMESTER: ICD-10-CM

## 2022-02-14 LAB
B-HCG SERPL-ACNC: <1 MIU/ML
T4 FREE SERPL-MCNC: 1 NG/DL (ref 0.8–1.7)
TSI SER-ACNC: 0.93 MIU/ML (ref 0.36–3.74)

## 2022-02-14 PROCEDURE — 84443 ASSAY THYROID STIM HORMONE: CPT

## 2022-02-14 PROCEDURE — 99214 OFFICE O/P EST MOD 30 MIN: CPT | Performed by: INTERNAL MEDICINE

## 2022-02-14 PROCEDURE — 84702 CHORIONIC GONADOTROPIN TEST: CPT

## 2022-02-14 PROCEDURE — 84439 ASSAY OF FREE THYROXINE: CPT

## 2022-02-14 PROCEDURE — 36415 COLL VENOUS BLD VENIPUNCTURE: CPT

## 2022-02-14 PROCEDURE — 3078F DIAST BP <80 MM HG: CPT | Performed by: INTERNAL MEDICINE

## 2022-02-14 PROCEDURE — 3074F SYST BP LT 130 MM HG: CPT | Performed by: INTERNAL MEDICINE

## 2022-04-30 ENCOUNTER — APPOINTMENT (OUTPATIENT)
Dept: ULTRASOUND IMAGING | Facility: HOSPITAL | Age: 42
End: 2022-04-30
Attending: EMERGENCY MEDICINE
Payer: COMMERCIAL

## 2022-04-30 ENCOUNTER — HOSPITAL ENCOUNTER (EMERGENCY)
Facility: HOSPITAL | Age: 42
Discharge: HOME | End: 2022-04-30
Attending: EMERGENCY MEDICINE
Payer: COMMERCIAL

## 2022-04-30 ENCOUNTER — APPOINTMENT (OUTPATIENT)
Dept: CT IMAGING | Facility: HOSPITAL | Age: 42
End: 2022-04-30
Attending: EMERGENCY MEDICINE
Payer: COMMERCIAL

## 2022-04-30 VITALS
HEART RATE: 61 BPM | WEIGHT: 140 LBS | BODY MASS INDEX: 22.5 KG/M2 | TEMPERATURE: 97 F | DIASTOLIC BLOOD PRESSURE: 83 MMHG | HEIGHT: 66 IN | RESPIRATION RATE: 18 BRPM | SYSTOLIC BLOOD PRESSURE: 129 MMHG | OXYGEN SATURATION: 100 %

## 2022-04-30 DIAGNOSIS — N92.1 MENORRHAGIA WITH IRREGULAR CYCLE: Primary | ICD-10-CM

## 2022-04-30 DIAGNOSIS — R10.9 LEFT SIDED ABDOMINAL PAIN: ICD-10-CM

## 2022-04-30 LAB
ANION GAP SERPL CALC-SCNC: 7 MMOL/L (ref 0–18)
ANTIBODY SCREEN: NEGATIVE
B-HCG UR QL: NEGATIVE
BASOPHILS # BLD AUTO: 0.03 X10(3) UL (ref 0–0.2)
BASOPHILS NFR BLD AUTO: 0.3 %
BILIRUB UR QL: NEGATIVE
BUN BLD-MCNC: 8 MG/DL (ref 7–18)
BUN/CREAT SERPL: 9.5 (ref 10–20)
CALCIUM BLD-MCNC: 9.4 MG/DL (ref 8.5–10.1)
CHLORIDE SERPL-SCNC: 107 MMOL/L (ref 98–112)
CLARITY UR: CLEAR
CO2 SERPL-SCNC: 25 MMOL/L (ref 21–32)
COLOR UR: YELLOW
CREAT BLD-MCNC: 0.84 MG/DL
DEPRECATED RDW RBC AUTO: 42.8 FL (ref 35.1–46.3)
EOSINOPHIL # BLD AUTO: 0.07 X10(3) UL (ref 0–0.7)
EOSINOPHIL NFR BLD AUTO: 0.8 %
ERYTHROCYTE [DISTWIDTH] IN BLOOD BY AUTOMATED COUNT: 13.2 % (ref 11–15)
GLUCOSE BLD-MCNC: 88 MG/DL (ref 70–99)
GLUCOSE UR-MCNC: NEGATIVE MG/DL
HCT VFR BLD AUTO: 42.5 %
HGB BLD-MCNC: 13.5 G/DL
IMM GRANULOCYTES # BLD AUTO: 0.03 X10(3) UL (ref 0–1)
IMM GRANULOCYTES NFR BLD: 0.3 %
KETONES UR-MCNC: NEGATIVE MG/DL
LEUKOCYTE ESTERASE UR QL STRIP.AUTO: NEGATIVE
LYMPHOCYTES # BLD AUTO: 2.32 X10(3) UL (ref 1–4)
LYMPHOCYTES NFR BLD AUTO: 25.7 %
MCH RBC QN AUTO: 28 PG (ref 26–34)
MCHC RBC AUTO-ENTMCNC: 31.8 G/DL (ref 31–37)
MCV RBC AUTO: 88.2 FL
MONOCYTES # BLD AUTO: 0.55 X10(3) UL (ref 0.1–1)
MONOCYTES NFR BLD AUTO: 6.1 %
NEUTROPHILS # BLD AUTO: 6.03 X10 (3) UL (ref 1.5–7.7)
NEUTROPHILS # BLD AUTO: 6.03 X10(3) UL (ref 1.5–7.7)
NEUTROPHILS NFR BLD AUTO: 66.8 %
NITRITE UR QL STRIP.AUTO: NEGATIVE
OSMOLALITY SERPL CALC.SUM OF ELEC: 286 MOSM/KG (ref 275–295)
PH UR: 7 [PH] (ref 5–8)
PLATELET # BLD AUTO: 298 10(3)UL (ref 150–450)
POTASSIUM SERPL-SCNC: 3.8 MMOL/L (ref 3.5–5.1)
PROT UR-MCNC: NEGATIVE MG/DL
RBC # BLD AUTO: 4.82 X10(6)UL
RBC #/AREA URNS AUTO: >10 /HPF
RH BLOOD TYPE: POSITIVE
RH BLOOD TYPE: POSITIVE
SODIUM SERPL-SCNC: 139 MMOL/L (ref 136–145)
SP GR UR STRIP: 1 (ref 1–1.03)
UROBILINOGEN UR STRIP-ACNC: <2
VIT C UR-MCNC: NEGATIVE MG/DL
WBC # BLD AUTO: 9 X10(3) UL (ref 4–11)

## 2022-04-30 PROCEDURE — 86901 BLOOD TYPING SEROLOGIC RH(D): CPT | Performed by: EMERGENCY MEDICINE

## 2022-04-30 PROCEDURE — 96374 THER/PROPH/DIAG INJ IV PUSH: CPT

## 2022-04-30 PROCEDURE — 86900 BLOOD TYPING SEROLOGIC ABO: CPT | Performed by: EMERGENCY MEDICINE

## 2022-04-30 PROCEDURE — 80048 BASIC METABOLIC PNL TOTAL CA: CPT | Performed by: EMERGENCY MEDICINE

## 2022-04-30 PROCEDURE — 81025 URINE PREGNANCY TEST: CPT

## 2022-04-30 PROCEDURE — 93975 VASCULAR STUDY: CPT | Performed by: EMERGENCY MEDICINE

## 2022-04-30 PROCEDURE — 74176 CT ABD & PELVIS W/O CONTRAST: CPT | Performed by: EMERGENCY MEDICINE

## 2022-04-30 PROCEDURE — 99285 EMERGENCY DEPT VISIT HI MDM: CPT

## 2022-04-30 PROCEDURE — 76856 US EXAM PELVIC COMPLETE: CPT | Performed by: EMERGENCY MEDICINE

## 2022-04-30 PROCEDURE — 81001 URINALYSIS AUTO W/SCOPE: CPT | Performed by: EMERGENCY MEDICINE

## 2022-04-30 PROCEDURE — 76830 TRANSVAGINAL US NON-OB: CPT | Performed by: EMERGENCY MEDICINE

## 2022-04-30 PROCEDURE — 85025 COMPLETE CBC W/AUTO DIFF WBC: CPT | Performed by: EMERGENCY MEDICINE

## 2022-04-30 PROCEDURE — 86850 RBC ANTIBODY SCREEN: CPT | Performed by: EMERGENCY MEDICINE

## 2022-04-30 PROCEDURE — 96361 HYDRATE IV INFUSION ADD-ON: CPT

## 2022-04-30 PROCEDURE — 96376 TX/PRO/DX INJ SAME DRUG ADON: CPT

## 2022-04-30 RX ORDER — KETOROLAC TROMETHAMINE 15 MG/ML
15 INJECTION, SOLUTION INTRAMUSCULAR; INTRAVENOUS ONCE
Status: COMPLETED | OUTPATIENT
Start: 2022-04-30 | End: 2022-04-30

## 2022-04-30 RX ORDER — KETOROLAC TROMETHAMINE 10 MG/1
10 TABLET, FILM COATED ORAL EVERY 6 HOURS PRN
Qty: 20 TABLET | Refills: 0 | Status: SHIPPED | OUTPATIENT
Start: 2022-04-30 | End: 2022-05-05

## 2022-05-04 ENCOUNTER — OFFICE VISIT (OUTPATIENT)
Dept: INTERNAL MEDICINE CLINIC | Facility: CLINIC | Age: 42
End: 2022-05-04
Payer: COMMERCIAL

## 2022-05-04 VITALS
DIASTOLIC BLOOD PRESSURE: 73 MMHG | BODY MASS INDEX: 22.5 KG/M2 | WEIGHT: 140 LBS | HEIGHT: 66 IN | SYSTOLIC BLOOD PRESSURE: 112 MMHG | HEART RATE: 74 BPM

## 2022-05-04 DIAGNOSIS — M79.89 MASS OF SOFT TISSUE OF FACE: ICD-10-CM

## 2022-05-04 DIAGNOSIS — R25.3 EYELID TWITCH: Primary | ICD-10-CM

## 2022-05-04 DIAGNOSIS — L84 CORN OR CALLUS: ICD-10-CM

## 2022-05-04 PROCEDURE — 3074F SYST BP LT 130 MM HG: CPT | Performed by: INTERNAL MEDICINE

## 2022-05-04 PROCEDURE — 3008F BODY MASS INDEX DOCD: CPT | Performed by: INTERNAL MEDICINE

## 2022-05-04 PROCEDURE — 3078F DIAST BP <80 MM HG: CPT | Performed by: INTERNAL MEDICINE

## 2022-05-04 PROCEDURE — 99213 OFFICE O/P EST LOW 20 MIN: CPT | Performed by: INTERNAL MEDICINE

## 2022-05-04 RX ORDER — FOLIC ACID 1 MG/1
TABLET ORAL DAILY
COMMUNITY

## 2022-05-24 ENCOUNTER — OFFICE VISIT (OUTPATIENT)
Dept: PODIATRY CLINIC | Facility: CLINIC | Age: 42
End: 2022-05-24
Payer: COMMERCIAL

## 2022-05-24 DIAGNOSIS — M79.671 RIGHT FOOT PAIN: ICD-10-CM

## 2022-05-24 DIAGNOSIS — L60.8 DISCOLORATION AND THICKENING OF NAILS BOTH FEET: ICD-10-CM

## 2022-05-24 DIAGNOSIS — B07.0 PLANTAR WART OF RIGHT FOOT: Primary | ICD-10-CM

## 2022-05-24 PROCEDURE — 99203 OFFICE O/P NEW LOW 30 MIN: CPT | Performed by: PODIATRIST

## 2022-05-24 PROCEDURE — 17110 DESTRUCTION B9 LES UP TO 14: CPT | Performed by: PODIATRIST

## 2022-05-24 PROCEDURE — 11755 BIOPSY NAIL UNIT: CPT | Performed by: PODIATRIST

## 2022-05-31 ENCOUNTER — OFFICE VISIT (OUTPATIENT)
Dept: PODIATRY CLINIC | Facility: CLINIC | Age: 42
End: 2022-05-31
Payer: COMMERCIAL

## 2022-05-31 DIAGNOSIS — L60.8 DISCOLORATION AND THICKENING OF NAILS BOTH FEET: ICD-10-CM

## 2022-05-31 DIAGNOSIS — B07.0 PLANTAR WART OF RIGHT FOOT: Primary | ICD-10-CM

## 2022-05-31 DIAGNOSIS — M79.671 RIGHT FOOT PAIN: ICD-10-CM

## 2022-05-31 PROCEDURE — 17110 DESTRUCTION B9 LES UP TO 14: CPT | Performed by: PODIATRIST

## 2022-06-07 ENCOUNTER — OFFICE VISIT (OUTPATIENT)
Dept: PODIATRY CLINIC | Facility: CLINIC | Age: 42
End: 2022-06-07
Payer: COMMERCIAL

## 2022-06-07 DIAGNOSIS — M79.671 RIGHT FOOT PAIN: ICD-10-CM

## 2022-06-07 DIAGNOSIS — B07.0 PLANTAR WART OF RIGHT FOOT: Primary | ICD-10-CM

## 2022-06-07 PROCEDURE — 17110 DESTRUCTION B9 LES UP TO 14: CPT | Performed by: PODIATRIST

## 2022-06-08 ENCOUNTER — HOSPITAL ENCOUNTER (OUTPATIENT)
Dept: MRI IMAGING | Facility: HOSPITAL | Age: 42
Discharge: HOME | End: 2022-06-08
Attending: INTERNAL MEDICINE
Payer: COMMERCIAL

## 2022-06-08 DIAGNOSIS — R25.3 EYELID TWITCH: ICD-10-CM

## 2022-06-08 DIAGNOSIS — M79.89 MASS OF SOFT TISSUE OF FACE: ICD-10-CM

## 2022-06-08 PROCEDURE — 70543 MRI ORBT/FAC/NCK W/O &W/DYE: CPT | Performed by: INTERNAL MEDICINE

## 2022-06-08 PROCEDURE — 70553 MRI BRAIN STEM W/O & W/DYE: CPT | Performed by: INTERNAL MEDICINE

## 2022-06-08 PROCEDURE — A9575 INJ GADOTERATE MEGLUMI 0.1ML: HCPCS | Performed by: INTERNAL MEDICINE

## 2022-06-21 ENCOUNTER — OFFICE VISIT (OUTPATIENT)
Dept: PODIATRY CLINIC | Facility: CLINIC | Age: 42
End: 2022-06-21
Payer: COMMERCIAL

## 2022-06-21 DIAGNOSIS — B07.0 PLANTAR WART OF RIGHT FOOT: Primary | ICD-10-CM

## 2022-06-21 DIAGNOSIS — M79.671 RIGHT FOOT PAIN: ICD-10-CM

## 2022-06-21 PROCEDURE — 17110 DESTRUCTION B9 LES UP TO 14: CPT | Performed by: PODIATRIST

## 2022-06-30 ENCOUNTER — PATIENT MESSAGE (OUTPATIENT)
Dept: INTERNAL MEDICINE CLINIC | Facility: CLINIC | Age: 42
End: 2022-06-30

## 2022-06-30 DIAGNOSIS — L81.9 HYPERPIGMENTATION: Primary | ICD-10-CM

## 2022-07-12 ENCOUNTER — TELEPHONE (OUTPATIENT)
Dept: FAMILY MEDICINE CLINIC | Facility: CLINIC | Age: 42
End: 2022-07-12

## 2022-07-12 ENCOUNTER — OFFICE VISIT (OUTPATIENT)
Dept: PODIATRY CLINIC | Facility: CLINIC | Age: 42
End: 2022-07-12
Payer: COMMERCIAL

## 2022-07-12 DIAGNOSIS — B07.0 PLANTAR WART: Primary | ICD-10-CM

## 2022-07-12 DIAGNOSIS — M79.671 RIGHT FOOT PAIN: ICD-10-CM

## 2022-07-12 DIAGNOSIS — B07.0 PLANTAR WART OF RIGHT FOOT: Primary | ICD-10-CM

## 2022-07-12 PROCEDURE — 17110 DESTRUCTION B9 LES UP TO 14: CPT | Performed by: PODIATRIST

## 2022-07-12 NOTE — TELEPHONE ENCOUNTER
Patient insisted on seeing Dr Chao Washington with no active referral, patient needs f/u referral for todays appt.

## 2022-07-13 NOTE — TELEPHONE ENCOUNTER
Dr. Ross Fraga, patient is requesting for referral for   Dr. David Cabral. I have pended referral for review.

## 2022-08-02 ENCOUNTER — OFFICE VISIT (OUTPATIENT)
Dept: PODIATRY CLINIC | Facility: CLINIC | Age: 42
End: 2022-08-02
Payer: COMMERCIAL

## 2022-08-02 DIAGNOSIS — B07.0 PLANTAR WART OF RIGHT FOOT: Primary | ICD-10-CM

## 2022-08-02 PROCEDURE — 99212 OFFICE O/P EST SF 10 MIN: CPT | Performed by: PODIATRIST

## 2022-08-03 ENCOUNTER — LAB REQUISITION (OUTPATIENT)
Dept: LAB | Facility: HOSPITAL | Age: 42
End: 2022-08-03
Payer: COMMERCIAL

## 2022-08-03 DIAGNOSIS — N96 RECURRENT PREGNANCY LOSS: ICD-10-CM

## 2022-08-03 LAB
THYROPEROXIDASE AB SERPL-ACNC: <28 U/ML (ref ?–60)
TSI SER-ACNC: 1 MIU/ML (ref 0.36–3.74)

## 2022-08-03 PROCEDURE — 86376 MICROSOMAL ANTIBODY EACH: CPT | Performed by: OBSTETRICS & GYNECOLOGY

## 2022-08-03 PROCEDURE — 85390 FIBRINOLYSINS SCREEN I&R: CPT | Performed by: OBSTETRICS & GYNECOLOGY

## 2022-08-03 PROCEDURE — 87591 N.GONORRHOEAE DNA AMP PROB: CPT | Performed by: OBSTETRICS & GYNECOLOGY

## 2022-08-03 PROCEDURE — 86147 CARDIOLIPIN ANTIBODY EA IG: CPT | Performed by: OBSTETRICS & GYNECOLOGY

## 2022-08-03 PROCEDURE — 85730 THROMBOPLASTIN TIME PARTIAL: CPT | Performed by: OBSTETRICS & GYNECOLOGY

## 2022-08-03 PROCEDURE — 85610 PROTHROMBIN TIME: CPT | Performed by: OBSTETRICS & GYNECOLOGY

## 2022-08-03 PROCEDURE — 86146 BETA-2 GLYCOPROTEIN ANTIBODY: CPT | Performed by: OBSTETRICS & GYNECOLOGY

## 2022-08-03 PROCEDURE — 85598 HEXAGNAL PHOSPH PLTLT NEUTRL: CPT | Performed by: OBSTETRICS & GYNECOLOGY

## 2022-08-03 PROCEDURE — 87491 CHLMYD TRACH DNA AMP PROBE: CPT | Performed by: OBSTETRICS & GYNECOLOGY

## 2022-08-03 PROCEDURE — 85613 RUSSELL VIPER VENOM DILUTED: CPT | Performed by: OBSTETRICS & GYNECOLOGY

## 2022-08-03 PROCEDURE — 84443 ASSAY THYROID STIM HORMONE: CPT | Performed by: OBSTETRICS & GYNECOLOGY

## 2022-08-04 LAB
C TRACH DNA SPEC QL NAA+PROBE: NEGATIVE
N GONORRHOEA DNA SPEC QL NAA+PROBE: NEGATIVE

## 2022-08-05 LAB
APTT PPP: 34.6 SECONDS (ref 23.3–35.6)
CONFIRM APTT STACLOT: NEGATIVE
CONFIRM DRVVT: 0.9 S (ref 0–1.1)
PROTHROMBIN TIME: 12.5 SECONDS (ref 11.6–14.8)

## 2022-08-06 LAB
B2 GLYCOPROT1 IGG SERPL IA-ACNC: 1.1 U/ML (ref ?–7)
B2 GLYCOPROT1 IGM SERPL IA-ACNC: <2.9 U/ML (ref ?–7)
CARDIOLIPIN IGG SERPL-ACNC: 0.5 GPL (ref ?–10)
CARDIOLIPIN IGM SERPL-ACNC: <0.8 MPL (ref ?–10)

## 2022-08-14 ENCOUNTER — HOSPITAL ENCOUNTER (OUTPATIENT)
Dept: ULTRASOUND IMAGING | Age: 42
End: 2022-08-14
Attending: INTERNAL MEDICINE
Payer: COMMERCIAL

## 2022-08-14 ENCOUNTER — HOSPITAL ENCOUNTER (OUTPATIENT)
Dept: ULTRASOUND IMAGING | Age: 42
Discharge: HOME | End: 2022-08-14
Attending: INTERNAL MEDICINE
Payer: COMMERCIAL

## 2022-08-14 DIAGNOSIS — E04.1 THYROID NODULE: ICD-10-CM

## 2022-08-14 PROCEDURE — 76536 US EXAM OF HEAD AND NECK: CPT | Performed by: INTERNAL MEDICINE

## 2022-08-29 ENCOUNTER — OFFICE VISIT (OUTPATIENT)
Dept: ENDOCRINOLOGY CLINIC | Facility: CLINIC | Age: 42
End: 2022-08-29
Payer: COMMERCIAL

## 2022-08-29 VITALS
HEART RATE: 69 BPM | WEIGHT: 143 LBS | SYSTOLIC BLOOD PRESSURE: 105 MMHG | BODY MASS INDEX: 23 KG/M2 | DIASTOLIC BLOOD PRESSURE: 69 MMHG

## 2022-08-29 DIAGNOSIS — E04.1 THYROID NODULE: Primary | ICD-10-CM

## 2022-08-29 PROCEDURE — 3078F DIAST BP <80 MM HG: CPT | Performed by: INTERNAL MEDICINE

## 2022-08-29 PROCEDURE — 99213 OFFICE O/P EST LOW 20 MIN: CPT | Performed by: INTERNAL MEDICINE

## 2022-08-29 PROCEDURE — 3074F SYST BP LT 130 MM HG: CPT | Performed by: INTERNAL MEDICINE

## 2022-08-29 RX ORDER — PROGESTERONE 200 MG/1
CAPSULE ORAL
COMMUNITY
Start: 2022-08-15

## 2022-10-14 ENCOUNTER — HOSPITAL ENCOUNTER (OUTPATIENT)
Dept: MAMMOGRAPHY | Facility: HOSPITAL | Age: 42
Discharge: HOME | End: 2022-10-14
Attending: INTERNAL MEDICINE
Payer: COMMERCIAL

## 2022-10-14 DIAGNOSIS — Z12.31 ENCOUNTER FOR SCREENING MAMMOGRAM FOR MALIGNANT NEOPLASM OF BREAST: ICD-10-CM

## 2022-10-14 PROCEDURE — 77063 BREAST TOMOSYNTHESIS BI: CPT | Performed by: INTERNAL MEDICINE

## 2022-10-14 PROCEDURE — 77067 SCR MAMMO BI INCL CAD: CPT | Performed by: INTERNAL MEDICINE

## 2022-10-31 ENCOUNTER — HOSPITAL ENCOUNTER (OUTPATIENT)
Age: 42
Discharge: HOME | End: 2022-10-31
Payer: COMMERCIAL

## 2022-10-31 VITALS
HEART RATE: 76 BPM | RESPIRATION RATE: 18 BRPM | DIASTOLIC BLOOD PRESSURE: 68 MMHG | TEMPERATURE: 98 F | SYSTOLIC BLOOD PRESSURE: 108 MMHG | OXYGEN SATURATION: 100 %

## 2022-10-31 DIAGNOSIS — J02.9 ACUTE VIRAL PHARYNGITIS: Primary | ICD-10-CM

## 2022-10-31 LAB
B-HCG UR QL: NEGATIVE
S PYO AG THROAT QL: NEGATIVE

## 2022-10-31 PROCEDURE — 81025 URINE PREGNANCY TEST: CPT

## 2022-10-31 PROCEDURE — 87880 STREP A ASSAY W/OPTIC: CPT

## 2022-10-31 PROCEDURE — 99212 OFFICE O/P EST SF 10 MIN: CPT

## 2022-10-31 PROCEDURE — 99213 OFFICE O/P EST LOW 20 MIN: CPT

## 2022-10-31 RX ORDER — IBUPROFEN 600 MG/1
TABLET ORAL
Qty: 20 TABLET | Refills: 0 | Status: SHIPPED | OUTPATIENT
Start: 2022-10-31

## 2022-10-31 NOTE — ED INITIAL ASSESSMENT (HPI)
Sore throat with fullness to both ears since last night, minimal cough, no fever, +exposure to strep

## 2022-11-15 ENCOUNTER — TELEPHONE (OUTPATIENT)
Dept: CASE MANAGEMENT | Age: 42
End: 2022-11-15

## 2022-11-15 DIAGNOSIS — B07.0 PLANTAR WART: Primary | ICD-10-CM

## 2022-11-15 DIAGNOSIS — M79.671 RIGHT FOOT PAIN: ICD-10-CM

## 2022-11-15 NOTE — TELEPHONE ENCOUNTER
Dr. Allison Better,    Patient needs referral for future follow up visits to see Dr. Jero King.    Appointment 11/16/22. Pended referral please review diagnosis and sign off if you agree. Thank you.   Tomasz Booth

## 2022-11-16 ENCOUNTER — OFFICE VISIT (OUTPATIENT)
Dept: PODIATRY CLINIC | Facility: CLINIC | Age: 42
End: 2022-11-16
Payer: COMMERCIAL

## 2022-11-16 DIAGNOSIS — M79.671 RIGHT FOOT PAIN: ICD-10-CM

## 2022-11-16 DIAGNOSIS — B07.0 PLANTAR WART OF RIGHT FOOT: Primary | ICD-10-CM

## 2022-11-16 PROCEDURE — 17110 DESTRUCTION B9 LES UP TO 14: CPT | Performed by: PODIATRIST

## 2022-12-27 ENCOUNTER — OFFICE VISIT (OUTPATIENT)
Dept: PODIATRY CLINIC | Facility: CLINIC | Age: 42
End: 2022-12-27
Payer: COMMERCIAL

## 2022-12-27 DIAGNOSIS — M79.671 RIGHT FOOT PAIN: ICD-10-CM

## 2022-12-27 DIAGNOSIS — B07.0 PLANTAR WART OF RIGHT FOOT: Primary | ICD-10-CM

## 2022-12-27 PROCEDURE — 17110 DESTRUCTION B9 LES UP TO 14: CPT | Performed by: PODIATRIST

## 2023-02-28 ENCOUNTER — PATIENT MESSAGE (OUTPATIENT)
Dept: INTERNAL MEDICINE CLINIC | Facility: CLINIC | Age: 43
End: 2023-02-28

## 2023-02-28 DIAGNOSIS — Z56.6 STRESS AT WORK: ICD-10-CM

## 2023-02-28 DIAGNOSIS — Z34.90 PREGNANCY, UNSPECIFIED GESTATIONAL AGE: Primary | ICD-10-CM

## 2023-02-28 DIAGNOSIS — Z71.9 ENCOUNTER FOR CONSULTATION: Primary | ICD-10-CM

## 2023-02-28 SDOH — HEALTH STABILITY - MENTAL HEALTH: OTHER PHYSICAL AND MENTAL STRAIN RELATED TO WORK: Z56.6

## 2023-03-01 NOTE — TELEPHONE ENCOUNTER
I approved referral for therapist as patient requested, you can fax this referral to 's office. Mental health services outside of PCP management. I do not feel that this referral  will help this counselor to get paid. He has to work with the insurance directly if he is part of the network . If he is out of network referral will not help. We never deals with mental health services anymore.   Please let patient know

## 2023-03-14 ENCOUNTER — OFFICE VISIT (OUTPATIENT)
Dept: OBGYN CLINIC | Facility: CLINIC | Age: 43
End: 2023-03-14

## 2023-03-14 VITALS
SYSTOLIC BLOOD PRESSURE: 130 MMHG | HEIGHT: 66 IN | DIASTOLIC BLOOD PRESSURE: 74 MMHG | WEIGHT: 151 LBS | BODY MASS INDEX: 24.27 KG/M2 | HEART RATE: 72 BPM

## 2023-03-14 DIAGNOSIS — N85.2 ENLARGED UTERUS: ICD-10-CM

## 2023-03-14 DIAGNOSIS — Z32.02 PREGNANCY EXAMINATION OR TEST, NEGATIVE RESULT: ICD-10-CM

## 2023-03-14 DIAGNOSIS — R10.2 PELVIC PAIN: ICD-10-CM

## 2023-03-14 DIAGNOSIS — R30.9 PAIN WITH URINATION: Primary | ICD-10-CM

## 2023-03-14 DIAGNOSIS — Z30.09 BIRTH CONTROL COUNSELING: ICD-10-CM

## 2023-03-14 LAB
APPEARANCE: CLEAR
BILIRUBIN: NEGATIVE
CONTROL LINE PRESENT WITH A CLEAR BACKGROUND (YES/NO): YES YES/NO
GLUCOSE (URINE DIPSTICK): NEGATIVE MG/DL
KETONES (URINE DIPSTICK): NEGATIVE MG/DL
KIT LOT #: NORMAL NUMERIC
MULTISTIX LOT#: ABNORMAL NUMERIC
NITRITE, URINE: NEGATIVE
PH, URINE: 6.5 (ref 4.5–8)
PREGNANCY TEST, URINE: NEGATIVE
PROTEIN (URINE DIPSTICK): NEGATIVE MG/DL
SPECIFIC GRAVITY: 1.03 (ref 1–1.03)
URINE-COLOR: YELLOW
UROBILINOGEN,SEMI-QN: 0.2 MG/DL (ref 0–1.9)

## 2023-03-14 PROCEDURE — 3008F BODY MASS INDEX DOCD: CPT | Performed by: ADVANCED PRACTICE MIDWIFE

## 2023-03-14 PROCEDURE — 3075F SYST BP GE 130 - 139MM HG: CPT | Performed by: ADVANCED PRACTICE MIDWIFE

## 2023-03-14 PROCEDURE — 81002 URINALYSIS NONAUTO W/O SCOPE: CPT | Performed by: ADVANCED PRACTICE MIDWIFE

## 2023-03-14 PROCEDURE — 81025 URINE PREGNANCY TEST: CPT | Performed by: ADVANCED PRACTICE MIDWIFE

## 2023-03-14 PROCEDURE — 99214 OFFICE O/P EST MOD 30 MIN: CPT | Performed by: ADVANCED PRACTICE MIDWIFE

## 2023-03-14 PROCEDURE — 3078F DIAST BP <80 MM HG: CPT | Performed by: ADVANCED PRACTICE MIDWIFE

## 2023-03-14 RX ORDER — FLUCONAZOLE 150 MG/1
150 TABLET ORAL
Qty: 1 TABLET | Refills: 0 | Status: SHIPPED | OUTPATIENT
Start: 2023-03-14

## 2023-03-14 RX ORDER — NITROFURANTOIN 25; 75 MG/1; MG/1
100 CAPSULE ORAL 2 TIMES DAILY
Qty: 14 CAPSULE | Refills: 0 | Status: SHIPPED | OUTPATIENT
Start: 2023-03-14 | End: 2023-03-21

## 2023-03-14 RX ORDER — ACETAMINOPHEN AND CODEINE PHOSPHATE 120; 12 MG/5ML; MG/5ML
0.35 SOLUTION ORAL DAILY
Qty: 28 TABLET | Refills: 11 | Status: SHIPPED | OUTPATIENT
Start: 2023-03-14 | End: 2024-03-13

## 2023-03-16 LAB
C TRACH DNA SPEC QL NAA+PROBE: NEGATIVE
N GONORRHOEA DNA SPEC QL NAA+PROBE: NEGATIVE

## 2023-03-17 LAB
GENITAL VAGINOSIS SCREEN: NEGATIVE
TRICHOMONAS SCREEN: NEGATIVE

## 2023-03-21 ENCOUNTER — TELEPHONE (OUTPATIENT)
Dept: OBGYN CLINIC | Facility: CLINIC | Age: 43
End: 2023-03-21

## 2023-03-21 NOTE — TELEPHONE ENCOUNTER
Spoke to patient notified results and providers recommendations. Pt expressed understanding and had no questions.

## 2023-03-21 NOTE — TELEPHONE ENCOUNTER
----- Message from Diallo Montalvo CNM sent at 3/20/2023 11:41 AM CDT -----  Please let pt know that urine culture negative for infection, so no UTI. She can stop the antibiotics. Her urine did have blood and leuks. Its possible that if she had intercourse recently the blood could be from that.  If not and the pain persists, I recommend referral to urology to check for other causes such as kidney stone, etc. Thanks MIKO

## 2023-04-26 RX ORDER — PROGESTERONE 200 MG/1
CAPSULE ORAL
Qty: 30 CAPSULE | Refills: 0 | Status: SHIPPED | OUTPATIENT
Start: 2023-04-26

## 2023-06-05 ENCOUNTER — HOSPITAL ENCOUNTER (OUTPATIENT)
Dept: ULTRASOUND IMAGING | Age: 43
Discharge: HOME | End: 2023-06-05
Attending: INTERNAL MEDICINE
Payer: COMMERCIAL

## 2023-06-05 DIAGNOSIS — E04.1 THYROID NODULE: ICD-10-CM

## 2023-06-05 PROCEDURE — 76536 US EXAM OF HEAD AND NECK: CPT | Performed by: INTERNAL MEDICINE

## 2023-06-19 RX ORDER — PROGESTERONE 200 MG/1
CAPSULE ORAL
Qty: 30 CAPSULE | Refills: 0 | OUTPATIENT
Start: 2023-06-19

## 2023-07-17 ENCOUNTER — HOSPITAL ENCOUNTER (OUTPATIENT)
Dept: ULTRASOUND IMAGING | Facility: HOSPITAL | Age: 43
Discharge: HOME | End: 2023-07-17
Attending: ADVANCED PRACTICE MIDWIFE
Payer: COMMERCIAL

## 2023-07-17 DIAGNOSIS — N85.2 ENLARGED UTERUS: ICD-10-CM

## 2023-07-17 DIAGNOSIS — R10.2 PELVIC PAIN: ICD-10-CM

## 2023-07-17 PROCEDURE — 76830 TRANSVAGINAL US NON-OB: CPT | Performed by: ADVANCED PRACTICE MIDWIFE

## 2023-07-17 PROCEDURE — 76856 US EXAM PELVIC COMPLETE: CPT | Performed by: ADVANCED PRACTICE MIDWIFE

## 2023-07-21 ENCOUNTER — TELEPHONE (OUTPATIENT)
Dept: OBGYN CLINIC | Facility: CLINIC | Age: 43
End: 2023-07-21

## 2023-07-21 NOTE — TELEPHONE ENCOUNTER
I spoke to CELIA from radiology regarding the report. She will forward the message to Radiologist and have the typo corrected. Once correction has been made the repost will be forward to the ordering provider. THE [facility] Radiology: [phone].

## 2023-07-21 NOTE — TELEPHONE ENCOUNTER
----- Message from Vidya Coker CNM sent at 7/18/2023 10:55 PM CDT -----  Please f/u with [facility] radiology re left ovarian cyst. Says 32.3 cm. I assume this is a typo and should be 3.23. Please call and verify and have them addend/correct.  Thanks MJ

## 2023-07-25 ENCOUNTER — LAB ENCOUNTER (OUTPATIENT)
Dept: LAB | Facility: HOSPITAL | Age: 43
End: 2023-07-25
Attending: ADVANCED PRACTICE MIDWIFE
Payer: COMMERCIAL

## 2023-07-25 ENCOUNTER — OFFICE VISIT (OUTPATIENT)
Dept: OBGYN CLINIC | Facility: CLINIC | Age: 43
End: 2023-07-25

## 2023-07-25 VITALS
HEART RATE: 72 BPM | BODY MASS INDEX: 22.5 KG/M2 | SYSTOLIC BLOOD PRESSURE: 111 MMHG | DIASTOLIC BLOOD PRESSURE: 74 MMHG | TEMPERATURE: 98 F | HEIGHT: 66 IN | WEIGHT: 140 LBS

## 2023-07-25 DIAGNOSIS — Z11.3 SCREEN FOR STD (SEXUALLY TRANSMITTED DISEASE): ICD-10-CM

## 2023-07-25 DIAGNOSIS — N93.0 PCB (POST COITAL BLEEDING): ICD-10-CM

## 2023-07-25 DIAGNOSIS — R39.9 UTI SYMPTOMS: ICD-10-CM

## 2023-07-25 DIAGNOSIS — N94.9 VAGINAL BURNING: Primary | ICD-10-CM

## 2023-07-25 DIAGNOSIS — Z12.4 SCREENING FOR MALIGNANT NEOPLASM OF CERVIX: ICD-10-CM

## 2023-07-25 DIAGNOSIS — Z32.00 PREGNANCY EXAMINATION OR TEST, PREGNANCY UNCONFIRMED: ICD-10-CM

## 2023-07-25 LAB
APPEARANCE: CLEAR
BILIRUBIN: NEGATIVE
CONTROL LINE PRESENT WITH A CLEAR BACKGROUND (YES/NO): YES YES/NO
GLUCOSE (URINE DIPSTICK): NEGATIVE MG/DL
HBV SURFACE AG SER-ACNC: <0.1 [IU]/L
HBV SURFACE AG SERPL QL IA: NONREACTIVE
HCV AB SERPL QL IA: NONREACTIVE
KETONES (URINE DIPSTICK): NEGATIVE MG/DL
LEUKOCYTES: NEGATIVE
MULTISTIX LOT#: NORMAL NUMERIC
NITRITE, URINE: NEGATIVE
OCCULT BLOOD: NEGATIVE
PH, URINE: 7.5 (ref 4.5–8)
PREGNANCY TEST, URINE: NEGATIVE
PROTEIN (URINE DIPSTICK): NEGATIVE MG/DL
SPECIFIC GRAVITY: 1 (ref 1–1.03)
URINE-COLOR: YELLOW
UROBILINOGEN,SEMI-QN: 0.2 MG/DL (ref 0–1.9)

## 2023-07-25 PROCEDURE — 81025 URINE PREGNANCY TEST: CPT | Performed by: ADVANCED PRACTICE MIDWIFE

## 2023-07-25 PROCEDURE — 87086 URINE CULTURE/COLONY COUNT: CPT

## 2023-07-25 PROCEDURE — 99214 OFFICE O/P EST MOD 30 MIN: CPT | Performed by: ADVANCED PRACTICE MIDWIFE

## 2023-07-25 PROCEDURE — 86803 HEPATITIS C AB TEST: CPT

## 2023-07-25 PROCEDURE — 87389 HIV-1 AG W/HIV-1&-2 AB AG IA: CPT

## 2023-07-25 PROCEDURE — 3008F BODY MASS INDEX DOCD: CPT | Performed by: ADVANCED PRACTICE MIDWIFE

## 2023-07-25 PROCEDURE — 3078F DIAST BP <80 MM HG: CPT | Performed by: ADVANCED PRACTICE MIDWIFE

## 2023-07-25 PROCEDURE — 86780 TREPONEMA PALLIDUM: CPT

## 2023-07-25 PROCEDURE — 3074F SYST BP LT 130 MM HG: CPT | Performed by: ADVANCED PRACTICE MIDWIFE

## 2023-07-25 PROCEDURE — 81002 URINALYSIS NONAUTO W/O SCOPE: CPT | Performed by: ADVANCED PRACTICE MIDWIFE

## 2023-07-25 PROCEDURE — 36415 COLL VENOUS BLD VENIPUNCTURE: CPT

## 2023-07-25 PROCEDURE — 87340 HEPATITIS B SURFACE AG IA: CPT

## 2023-07-26 LAB
C TRACH DNA SPEC QL NAA+PROBE: NEGATIVE
HPV I/H RISK 1 DNA SPEC QL NAA+PROBE: NEGATIVE
HSV1 DNA SPEC QL NAA+PROBE: NEGATIVE
HSV2 DNA SPEC QL NAA+PROBE: NEGATIVE
N GONORRHOEA DNA SPEC QL NAA+PROBE: NEGATIVE
T PALLIDUM AB SER QL: NEGATIVE

## 2023-07-27 LAB
GENITAL VAGINOSIS SCREEN: NEGATIVE
TRICHOMONAS SCREEN: NEGATIVE

## 2023-09-08 ENCOUNTER — HOSPITAL ENCOUNTER (OUTPATIENT)
Dept: GENERAL RADIOLOGY | Age: 43
Discharge: HOME | End: 2023-09-08
Attending: INTERNAL MEDICINE
Payer: COMMERCIAL

## 2023-09-08 ENCOUNTER — OFFICE VISIT (OUTPATIENT)
Dept: INTERNAL MEDICINE CLINIC | Facility: CLINIC | Age: 43
End: 2023-09-08

## 2023-09-08 ENCOUNTER — LAB ENCOUNTER (OUTPATIENT)
Dept: LAB | Age: 43
End: 2023-09-08
Attending: INTERNAL MEDICINE
Payer: COMMERCIAL

## 2023-09-08 VITALS
HEIGHT: 66 IN | DIASTOLIC BLOOD PRESSURE: 70 MMHG | RESPIRATION RATE: 18 BRPM | HEART RATE: 76 BPM | SYSTOLIC BLOOD PRESSURE: 108 MMHG | WEIGHT: 146.69 LBS | BODY MASS INDEX: 23.58 KG/M2

## 2023-09-08 DIAGNOSIS — M41.26 OTHER IDIOPATHIC SCOLIOSIS, LUMBAR REGION: ICD-10-CM

## 2023-09-08 DIAGNOSIS — L81.9 HYPERPIGMENTATION: ICD-10-CM

## 2023-09-08 DIAGNOSIS — M54.50 LUMBAR PAIN: ICD-10-CM

## 2023-09-08 DIAGNOSIS — G89.29 CHRONIC LEFT SACROILIAC PAIN: ICD-10-CM

## 2023-09-08 DIAGNOSIS — M53.3 CHRONIC LEFT SACROILIAC PAIN: ICD-10-CM

## 2023-09-08 DIAGNOSIS — Z00.00 PHYSICAL EXAM, ANNUAL: ICD-10-CM

## 2023-09-08 DIAGNOSIS — Z12.31 BREAST CANCER SCREENING BY MAMMOGRAM: ICD-10-CM

## 2023-09-08 DIAGNOSIS — Z00.00 PHYSICAL EXAM, ANNUAL: Primary | ICD-10-CM

## 2023-09-08 PROBLEM — N92.6 IRREGULAR MENSTRUATION: Status: ACTIVE | Noted: 2023-09-08

## 2023-09-08 LAB
CHOLEST SERPL-MCNC: 218 MG/DL (ref ?–200)
FASTING PATIENT LIPID ANSWER: NO
HDLC SERPL-MCNC: 85 MG/DL (ref 40–59)
LDLC SERPL CALC-MCNC: 114 MG/DL (ref ?–100)
NONHDLC SERPL-MCNC: 133 MG/DL (ref ?–130)
TRIGL SERPL-MCNC: 109 MG/DL (ref 30–149)
TSI SER-ACNC: 1.01 MIU/ML (ref 0.36–3.74)
VLDLC SERPL CALC-MCNC: 19 MG/DL (ref 0–30)

## 2023-09-08 PROCEDURE — 99396 PREV VISIT EST AGE 40-64: CPT | Performed by: INTERNAL MEDICINE

## 2023-09-08 PROCEDURE — 3074F SYST BP LT 130 MM HG: CPT | Performed by: INTERNAL MEDICINE

## 2023-09-08 PROCEDURE — 3008F BODY MASS INDEX DOCD: CPT | Performed by: INTERNAL MEDICINE

## 2023-09-08 PROCEDURE — 36415 COLL VENOUS BLD VENIPUNCTURE: CPT

## 2023-09-08 PROCEDURE — 3078F DIAST BP <80 MM HG: CPT | Performed by: INTERNAL MEDICINE

## 2023-09-08 PROCEDURE — 84443 ASSAY THYROID STIM HORMONE: CPT

## 2023-09-08 PROCEDURE — 80061 LIPID PANEL: CPT

## 2023-09-20 ENCOUNTER — PATIENT MESSAGE (OUTPATIENT)
Dept: INTERNAL MEDICINE CLINIC | Facility: CLINIC | Age: 43
End: 2023-09-20

## 2023-09-22 NOTE — TELEPHONE ENCOUNTER
Per progress note by LISA Blount/ADÁN under direct supervision of Merrill Bragg [place]  7/25/2023:  Vaginal cultures obtained. UA dip does not appear to have UTI but will send culture. STD testing ordered. Causes of urinary symptoms including STIs, UTI  Pelvic US from 2 weeks ago reviewed with pt. Discussed overall normal. Discussed possible causes of bleeding after intercourse. Cultures done to r/o infection. If all normal plan referral to MD for further eval. Discussed possible polyp which is not always seen on ultrasound.    If cultures negative, consider referral to gyne for further workup

## 2023-12-13 ENCOUNTER — OFFICE VISIT (OUTPATIENT)
Dept: DERMATOLOGY CLINIC | Facility: CLINIC | Age: 43
End: 2023-12-13
Payer: COMMERCIAL

## 2023-12-13 DIAGNOSIS — L81.1 MELASMA: Primary | ICD-10-CM

## 2023-12-13 PROCEDURE — 99204 OFFICE O/P NEW MOD 45 MIN: CPT | Performed by: STUDENT IN AN ORGANIZED HEALTH CARE EDUCATION/TRAINING PROGRAM

## 2023-12-13 RX ORDER — TRANEXAMIC ACID 650 MG/1
TABLET ORAL
Qty: 90 TABLET | Refills: 0 | Status: SHIPPED | OUTPATIENT
Start: 2023-12-13

## 2023-12-13 NOTE — PROGRESS NOTES
December 13, 2023    New patient     CHIEF COMPLAINT: Hyperpigmentation     HISTORY OF PRESENT ILLNESS: .    1. Hyperpigmentation  Location: Above Upper Lip   Duration: 3 years  Signs and symptoms: Brown discoloration   Current treatment: Retinol-A nightly (some improvement)  Past treatments: None     DERM HISTORY:  History of chronic skin disease/condition: No    FAMILY HISTORY:  History of chronic skin disease/condition: No    History/Other:    REVIEW OF SYSTEMS:  Constitutional: Denies fever, chills, unintentional weight loss. Skin as per HPI    PAST MEDICAL HISTORY:  Past Medical History:   Diagnosis Date    Hyperlipidemia     Thyroid nodule        Medications  No current outpatient medications on file. Objective:    PHYSICAL EXAM:  General: awake, alert, no acute distress  Skin: Skin exam was performed today including the following: upper cutaneosu lip. Pertinent findings include:   - with hyperpigmented macules    ASSESSMENT & PLAN:  Pathophysiology of diagnoses discussed with patient. Therapeutic options reviewed. Risks, benefits, and alternatives discussed with patient. Instructions reviewed at length. #Melasma   - Start oral TXA 650mg TID. Risks, benefits, and alternatives discussed with patient.   - In reserve: discuss Fraxel repair vs other laser     Return to clinic: 3 months  or sooner if something concerning arises     Maylin Harden MD

## 2023-12-19 ENCOUNTER — TELEPHONE (OUTPATIENT)
Dept: DERMATOLOGY CLINIC | Facility: CLINIC | Age: 43
End: 2023-12-19

## 2023-12-19 NOTE — TELEPHONE ENCOUNTER
Pharmacy states the below medication is on back order.      tranexamic acid 650 MG Oral Tab, 1 po qd, Disp: 90 tablet, Rfl: 0

## 2023-12-19 NOTE — TELEPHONE ENCOUNTER
"I did not write \"Approving, but needs appt for refills.\" This was added by Epic, I think.   " Called and verified with [facility] - tranexamic acid on backorder, no release date, no suggested alternative either. How would you like to proceed? Laser?

## 2023-12-19 NOTE — TELEPHONE ENCOUNTER
Please recommend patient call diff pharmacies to see if they can get it for her I am happy to send it elsewhere. If not the next step would cream vs laser.  I am happy to send in the cream.

## 2024-03-02 ENCOUNTER — OFFICE VISIT (OUTPATIENT)
Dept: ENDOCRINOLOGY CLINIC | Facility: CLINIC | Age: 44
End: 2024-03-02
Payer: COMMERCIAL

## 2024-03-02 VITALS
BODY MASS INDEX: 23.14 KG/M2 | HEART RATE: 73 BPM | HEIGHT: 66 IN | WEIGHT: 144 LBS | SYSTOLIC BLOOD PRESSURE: 110 MMHG | DIASTOLIC BLOOD PRESSURE: 74 MMHG

## 2024-03-02 DIAGNOSIS — E04.1 THYROID NODULE: Primary | ICD-10-CM

## 2024-03-02 PROCEDURE — 3074F SYST BP LT 130 MM HG: CPT | Performed by: INTERNAL MEDICINE

## 2024-03-02 PROCEDURE — 3008F BODY MASS INDEX DOCD: CPT | Performed by: INTERNAL MEDICINE

## 2024-03-02 PROCEDURE — 99213 OFFICE O/P EST LOW 20 MIN: CPT | Performed by: INTERNAL MEDICINE

## 2024-03-02 PROCEDURE — 3078F DIAST BP <80 MM HG: CPT | Performed by: INTERNAL MEDICINE

## 2024-03-02 NOTE — PROGRESS NOTES
Name: Sal Meyer  Date: 3/2/2024    Referring Physician: Duyen Sherwood    Chief Complaint   Patient presents with    Follow - Up       HISTORY OF PRESENT ILLNESS   Sal Meyer is a 43 year old female who presents for   Chief Complaint   Patient presents with    Follow - Up     44 y/o F presents for follow up evaluation of thyroid nodule.  She was diagnosed with nodule in 3/2021 after MRI of her face.  Therefore found underwent Thyroid US which demonstrated 2.5mc nodule.  Her initial FNA demonstrated AFLUS on the pathology and repeat FNA in 1/2022 demonstrated benign nodule.     Since that time repeat US demonstrates stable nodule.     Compression Symptoms:   Dysphagia: No   Dyspnea: No   Anterior Neck Pain: No  Voice Change: No     No h/o neck XRT.     Aunt thyroid nodule, benign.  No family h/o thyroid cancer.      REVIEW OF SYSTEMS  Eyes: no change in vision  Neurologic: no headache, generalized or focal weakness or numbness.  Head: normal  ENT: normal  Lungs: no shortness of breath, wheezing or SANCHEZ  Cardiovascular:  no chest pain or palpitations  Gastrointestinal:  no abdominal pain, bowel movement problems  Musculoskeletal: no muscle pain or arthralgia  /Gyne: no frequency or discomfort while urinating  Psychiatric:  no acute distress, anxiety  or depression  Skin: normal moisturized skin    Medications:     Current Outpatient Medications:     tranexamic acid 650 MG Oral Tab, 1 po qd, Disp: 90 tablet, Rfl: 0     Allergies:   Allergies   Allergen Reactions    Dust      Eyes swell       Social History:   Social History     Socioeconomic History    Marital status:     Number of children: 2   Occupational History    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[employer]     Employer: [employer] [place]     Comment: retired    Occupation: English,      Employer: [employer]   Tobacco Use    Smoking status: Former     Packs/day: 0     Types: Cigarettes     Quit date: 1/1/2023      Years since quittin.1    Smokeless tobacco: Never    Tobacco comments:     vape   Vaping Use    Vaping Use: Every day    Substances: Nicotine   Substance and Sexual Activity    Alcohol use: No     Alcohol/week: 0.0 standard drinks of alcohol    Drug use: No    Sexual activity: Not Currently     Comment: withdrawal   Other Topics Concern    Exercise Yes    Reaction to local anesthetic No       Medical History:   Past Medical History:   Diagnosis Date    Hyperlipidemia     Thyroid nodule        Surgical history:   Past Surgical History:   Procedure Laterality Date    COLONOSCOPY  2015    [facility]    HEMORRHOIDECTOMY  2015    IR FNA THYROID (CPT=10005) Right 2021    LAPAROSCOPIC CHOLECYSTECTOMY  2013    Chirag MCBRIDE, IUD  2020       PHYSICAL EXAMINATION:  /74   Pulse 73   Ht 5' 6\" (1.676 m)   Wt 144 lb (65.3 kg)   LMP 2023 (Exact Date)   BMI 23.24 kg/m²     General Appearance:  Alert, in no acute distress, well developed  Eyes: normal conjunctivae, sclera.  Ears/Nose/Mouth/Throat/Neck:  normal hearing, normal speech and irregular thyroid gland   Musculoskeletal:  normal muscle strength and tone  PV: normal pulses of carotids, pedals  Skin:  normal moisture and skin texture  Hair & Nails:  normal scalp hair     Neuro:  sensory grossly intact and motor grossly intact  Psychiatric:  oriented to time, self, and place  Nutritional:  no abnormal weight gain or loss    ASSESSMENT/PLAN:    1. Thyroid Nodule  -Discussed common occurrence of thyroid nodules in the population approx 50-60% of the population  -Discussed risk of malignancy is approx 3-5%, 95-97% of nodules are benign  -Discussed recommendation to perform FNA biopsy of nodules greater than 1cm in size  -Discussed initial biopsy result of AFLUS, repeat FNA was benign  -Continue with yearly thyroid US   -Thyroid US stable 2023  -Repeat US ordered for summer to monitor nodules   -Normal TFTs     RTC 1 year        3/2/2024  Kristine Juárez MD

## 2024-03-04 ENCOUNTER — HOSPITAL ENCOUNTER (OUTPATIENT)
Dept: ULTRASOUND IMAGING | Age: 44
Discharge: HOME | End: 2024-03-04
Attending: INTERNAL MEDICINE
Payer: COMMERCIAL

## 2024-03-04 DIAGNOSIS — E04.1 THYROID NODULE: ICD-10-CM

## 2024-03-04 PROCEDURE — 76536 US EXAM OF HEAD AND NECK: CPT | Performed by: INTERNAL MEDICINE

## 2024-05-01 ENCOUNTER — OFFICE VISIT (OUTPATIENT)
Dept: INTERNAL MEDICINE CLINIC | Facility: CLINIC | Age: 44
End: 2024-05-01
Payer: COMMERCIAL

## 2024-05-01 VITALS
HEIGHT: 66 IN | DIASTOLIC BLOOD PRESSURE: 75 MMHG | BODY MASS INDEX: 21.69 KG/M2 | SYSTOLIC BLOOD PRESSURE: 110 MMHG | TEMPERATURE: 78 F | WEIGHT: 135 LBS

## 2024-05-01 DIAGNOSIS — Z12.11 COLON CANCER SCREENING: ICD-10-CM

## 2024-05-01 DIAGNOSIS — R63.4 WEIGHT LOSS: Primary | ICD-10-CM

## 2024-05-01 DIAGNOSIS — M54.50 LUMBAR PAIN: ICD-10-CM

## 2024-05-01 DIAGNOSIS — Z12.31 BREAST CANCER SCREENING BY MAMMOGRAM: ICD-10-CM

## 2024-05-01 PROCEDURE — 3078F DIAST BP <80 MM HG: CPT | Performed by: INTERNAL MEDICINE

## 2024-05-01 PROCEDURE — 3074F SYST BP LT 130 MM HG: CPT | Performed by: INTERNAL MEDICINE

## 2024-05-01 PROCEDURE — 3008F BODY MASS INDEX DOCD: CPT | Performed by: INTERNAL MEDICINE

## 2024-05-01 PROCEDURE — 99214 OFFICE O/P EST MOD 30 MIN: CPT | Performed by: INTERNAL MEDICINE

## 2024-05-01 NOTE — PROGRESS NOTES
Subjective:     Patient ID: Sal Meyer is a 43 year old female.  Presents to address several concerns  HPI  Patient states that she has been feeling tired overall, for 2 months has no appetite no desire to eat.  She has lost 17 pounds since last year, does feel stressed with the life work but not to the point that it is overwhelming feeling of stress.  She is talking to counselor.  Every morning he has diarrhea states it happens to her when she feels stressed out.  Trying to eat protein bars.  Denies abdominal pain no chest pain or shortness of breath, dealing with a chronic low back pain finally scheduled MRI of the lumbar spine, couple days ago at night he had pain in the medial aspect of the left thigh associated with a muscle spasm which she could see.  Today she has no pain in the leg.  But her typical pain seems to travel from low back down to the foot on and off for a long time.  Patient under more blood testing at work, CBC CMP TSH LDH were normal.  Neutrophil count 7.8 upper limit of normal 7.0.  Total cholesterol slightly elevated,     Current Outpatient Medications   Medication Sig Dispense Refill    tranexamic acid 650 MG Oral Tab 1 po qd 90 tablet 0     Allergies:  Allergies   Allergen Reactions    Dust      Eyes swell       Past Medical History:    Hyperlipidemia    Thyroid nodule      Past Surgical History:   Procedure Laterality Date    Colonoscopy  07/22/2015    [facility]    Hemorrhoidectomy  03/2015    Ir fna thyroid (cpt=10005) Right 03/23/2021    Laparoscopic cholecystectomy  04/26/2013    Chirag Gray, iud  06/26/2020      Family History   Problem Relation Age of Onset    Heart Attack Father     Hypertension Mother     Heart Disease Mother     Cancer Neg       Social History:   Social History     Socioeconomic History    Marital status:     Number of children: 2   Occupational History    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[employer]     Employer: [employer]      Comment: retired    Occupation: English,      Employer: [employer]   Tobacco Use    Smoking status: Former     Current packs/day: 0.00     Types: Cigarettes     Quit date: 2023     Years since quittin.3    Smokeless tobacco: Never    Tobacco comments:     vape   Vaping Use    Vaping status: Every Day    Substances: Nicotine   Substance and Sexual Activity    Alcohol use: No     Alcohol/week: 0.0 standard drinks of alcohol    Drug use: No    Sexual activity: Not Currently     Comment: withdrawal   Other Topics Concern    Exercise Yes    Reaction to local anesthetic No        /75   Temp (!) 78 °F (25.6 °C)   Ht 5' 6\" (1.676 m)   Wt 135 lb (61.2 kg)   LMP 2023 (Exact Date)   BMI 21.79 kg/m²    Physical Exam  Constitutional:       Appearance: Normal appearance.   HENT:      Head: Normocephalic and atraumatic.      Right Ear: Tympanic membrane and ear canal normal. There is no impacted cerumen.      Left Ear: Tympanic membrane and ear canal normal. There is no impacted cerumen.   Eyes:      General: No scleral icterus.     Extraocular Movements: Extraocular movements intact.      Conjunctiva/sclera: Conjunctivae normal.      Pupils: Pupils are equal, round, and reactive to light.   Neck:      Vascular: No carotid bruit.   Cardiovascular:      Rate and Rhythm: Normal rate and regular rhythm.      Heart sounds: No murmur heard.  Pulmonary:      Effort: Pulmonary effort is normal. No respiratory distress.      Breath sounds: No wheezing.   Abdominal:      General: Bowel sounds are normal.      Palpations: Abdomen is soft. There is no mass.      Tenderness: There is no abdominal tenderness. There is no guarding or rebound.   Musculoskeletal:         General: Normal range of motion.      Cervical back: Normal range of motion and neck supple.      Right lower leg: No edema.      Left lower leg: No edema.   Lymphadenopathy:      Cervical: No cervical adenopathy.   Skin:      General: Skin is warm.      Coloration: Skin is not jaundiced.   Neurological:      General: No focal deficit present.      Mental Status: She is alert and oriented to person, place, and time. Mental status is at baseline.      Gait: Gait normal.      Deep Tendon Reflexes: Reflexes normal.   Psychiatric:         Mood and Affect: Mood normal.         Behavior: Behavior normal.         Thought Content: Thought content normal.         Judgment: Judgment normal.         Assessment & Plan:       ICD-10-CM    1. Weight loss due to be determined, followed malignancy will order CT scan of the chest abdomen and pelvis.  Advised patient to assure that she consumes enough calories per day, monitors weight R63.4 CT CHEST+ABDOMEN+PELVIS(ALL CNTRST ONLY)(CPT=71260/03437)     Gastro Referral - In Network      2. Lumbar pain patient will schedule MRI of the lumbar spine rule out disc herniation M54.50       3. Breast cancer screening by mammogram  Z12.31 [facility] 2D+3D SCREENING BILAT (CPT=77067/76644)     [facility] 2D+3D SCREENING BILAT (CPT=77067/12244)      4. Colon cancer screening referred patient to see gastroenterology Z12.11 Gastro Referral - In Network               Meds This Visit:  Requested Prescriptions      No prescriptions requested or ordered in this encounter       Imaging & Referrals:  GASTRO - INTERNAL  [facility] 2D+3D SCREENING BILAT (CPT=77067/05336)  CT CHEST+ABDOMEN+PELVIS(ALL CNTRST ONLY)(WPT=26890/96299)  [facility] 2D+3D SCREENING BILAT (CPT=77067/47339)

## 2024-05-25 ENCOUNTER — HOSPITAL ENCOUNTER (OUTPATIENT)
Dept: CT IMAGING | Facility: HOSPITAL | Age: 44
Discharge: HOME | End: 2024-05-25
Attending: INTERNAL MEDICINE

## 2024-05-25 DIAGNOSIS — R63.4 WEIGHT LOSS: ICD-10-CM

## 2024-05-25 LAB
CREAT BLD-MCNC: 0.6 MG/DL
EGFRCR SERPLBLD CKD-EPI 2021: 113 ML/MIN/1.73M2 (ref 60–?)

## 2024-05-25 PROCEDURE — 82565 ASSAY OF CREATININE: CPT

## 2024-05-25 PROCEDURE — 71260 CT THORAX DX C+: CPT | Performed by: INTERNAL MEDICINE

## 2024-05-25 PROCEDURE — 74177 CT ABD & PELVIS W/CONTRAST: CPT | Performed by: INTERNAL MEDICINE

## 2024-06-01 ENCOUNTER — HOSPITAL ENCOUNTER (OUTPATIENT)
Dept: MAMMOGRAPHY | Age: 44
Discharge: HOME | End: 2024-06-01
Attending: INTERNAL MEDICINE
Payer: COMMERCIAL

## 2024-06-01 DIAGNOSIS — Z12.31 BREAST CANCER SCREENING BY MAMMOGRAM: ICD-10-CM

## 2024-06-01 PROCEDURE — 77067 SCR MAMMO BI INCL CAD: CPT | Performed by: INTERNAL MEDICINE

## 2024-06-01 PROCEDURE — 77063 BREAST TOMOSYNTHESIS BI: CPT | Performed by: INTERNAL MEDICINE

## 2024-06-06 ENCOUNTER — HOSPITAL ENCOUNTER (OUTPATIENT)
Dept: MRI IMAGING | Age: 44
Discharge: HOME | End: 2024-06-06
Attending: INTERNAL MEDICINE
Payer: COMMERCIAL

## 2024-06-06 DIAGNOSIS — M54.50 LUMBAR PAIN: ICD-10-CM

## 2024-06-06 PROCEDURE — 72148 MRI LUMBAR SPINE W/O DYE: CPT | Performed by: INTERNAL MEDICINE

## 2024-06-07 ENCOUNTER — HOSPITAL ENCOUNTER (OUTPATIENT)
Dept: MAMMOGRAPHY | Facility: HOSPITAL | Age: 44
Discharge: HOME | End: 2024-06-07
Attending: INTERNAL MEDICINE
Payer: COMMERCIAL

## 2024-06-07 DIAGNOSIS — R92.2 INCONCLUSIVE MAMMOGRAM: ICD-10-CM

## 2024-06-07 PROCEDURE — 76642 ULTRASOUND BREAST LIMITED: CPT | Performed by: INTERNAL MEDICINE

## 2024-06-07 PROCEDURE — 77065 DX MAMMO INCL CAD UNI: CPT | Performed by: INTERNAL MEDICINE

## 2024-06-07 PROCEDURE — 77061 BREAST TOMOSYNTHESIS UNI: CPT | Performed by: INTERNAL MEDICINE

## 2024-06-12 ENCOUNTER — TELEPHONE (OUTPATIENT)
Dept: INTERNAL MEDICINE CLINIC | Facility: CLINIC | Age: 44
End: 2024-06-12

## 2024-06-12 DIAGNOSIS — M54.50 LUMBAR PAIN: Primary | ICD-10-CM

## 2024-06-12 NOTE — TELEPHONE ENCOUNTER
Dr Sherwood:  patient has reviewed all her test result comments in [email or web address].     She agrees to see PT. She would like to go to [facility] in [place]. Its closer to home.     Please see pended order.

## 2024-06-13 NOTE — TELEPHONE ENCOUNTER
I placed a referral for physical therapy patient lives in [place] I hope it possible for her to use [facility] in [place] I placed external physical therapy referral

## 2024-07-12 ENCOUNTER — TELEPHONE (OUTPATIENT)
Dept: INTERNAL MEDICINE CLINIC | Facility: CLINIC | Age: 44
End: 2024-07-12

## 2024-07-12 NOTE — TELEPHONE ENCOUNTER
Received fax from [facility].  Forms were placed on provider's desk for review.

## 2024-07-31 ENCOUNTER — TELEPHONE (OUTPATIENT)
Dept: INTERNAL MEDICINE CLINIC | Facility: CLINIC | Age: 44
End: 2024-07-31

## 2024-08-06 ENCOUNTER — TELEPHONE (OUTPATIENT)
Dept: INTERNAL MEDICINE CLINIC | Facility: CLINIC | Age: 44
End: 2024-08-06

## 2024-08-06 NOTE — TELEPHONE ENCOUNTER
Received fax from [facility].   Forms were placed on provider's desk for review.

## 2024-09-11 ENCOUNTER — TELEPHONE (OUTPATIENT)
Dept: INTERNAL MEDICINE CLINIC | Facility: CLINIC | Age: 44
End: 2024-09-11

## 2025-02-01 ENCOUNTER — HOSPITAL ENCOUNTER (OUTPATIENT)
Age: 45
Discharge: HOME | End: 2025-02-01
Attending: EMERGENCY MEDICINE
Payer: COMMERCIAL

## 2025-02-01 VITALS
BODY MASS INDEX: 23.3 KG/M2 | HEIGHT: 66 IN | WEIGHT: 145 LBS | HEART RATE: 69 BPM | RESPIRATION RATE: 20 BRPM | SYSTOLIC BLOOD PRESSURE: 121 MMHG | OXYGEN SATURATION: 99 % | TEMPERATURE: 99 F | DIASTOLIC BLOOD PRESSURE: 70 MMHG

## 2025-02-01 DIAGNOSIS — U07.1 COVID-19: Primary | ICD-10-CM

## 2025-02-01 LAB
POCT INFLUENZA A: NEGATIVE
POCT INFLUENZA B: NEGATIVE
S PYO AG THROAT QL IA.RAPID: NEGATIVE
SARS-COV-2 RNA RESP QL NAA+PROBE: DETECTED

## 2025-02-01 PROCEDURE — 87651 STREP A DNA AMP PROBE: CPT | Performed by: EMERGENCY MEDICINE

## 2025-02-01 PROCEDURE — 87502 INFLUENZA DNA AMP PROBE: CPT | Performed by: EMERGENCY MEDICINE

## 2025-02-01 PROCEDURE — 99203 OFFICE O/P NEW LOW 30 MIN: CPT

## 2025-02-01 PROCEDURE — 99213 OFFICE O/P EST LOW 20 MIN: CPT

## 2025-02-01 NOTE — ED INITIAL ASSESSMENT (HPI)
Sore throat, congestion, cough, runny nose starting last Sunday. Body aches x2 days. Denies fevers.

## 2025-02-01 NOTE — ED PROVIDER NOTES
Patient Seen in: [facility]      History     Chief Complaint   Patient presents with    Cough/URI     Stated Complaint: sore throat, cough congestion    Subjective:   HPI      44-year-old female with a 6-day history of bodyaches, sore throat and nasal congestion with a mild cough.  No vomiting or diarrhea.  Symptoms have been intermittent for the past handful of days.    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[employer]     Employer: [employer]     Comment: retired    Occupation: English,      Employer: [employer]   Tobacco Use    Smoking status: Former     Current packs/day: 0.00     Types: Cigarettes     Quit date: 2023     Years since quittin.0    Smokeless tobacco: Never    Tobacco comments:     vape   Vaping Use    Vaping status: Every Day    Substances: Nicotine   Substance and Sexual Activity    Alcohol use: No     Alcohol/week: 0.0 standard drinks of alcohol    Drug use: No    Sexual activity: Not Currently     Comment: withdrawal   Other Topics Concern    Exercise Yes    Reaction to local anesthetic No              Review of Systems    Positive for stated complaint: sore throat, cough congestion  Other systems are as noted in HPI.  Constitutional and vital signs reviewed.      All other systems reviewed and negative except as noted above.    Physical Exam     ED Triage Vitals [25 1430]   /70   Pulse 69   Resp 20   Temp 98.5 °F (36.9 °C)   Temp src Oral   SpO2 99 %   O2 Device None  (Room air)       Current Vitals:   Vital Signs  BP: 121/70  Pulse: 69  Resp: 20  Temp: 98.5 °F (36.9 °C)  Temp src: Oral    Oxygen Therapy  SpO2: 99 %  O2 Device: None (Room air)        Physical Exam     General Appearance: This is a middle-aged female sitting on a gurney in no apparent distress.  Vital signs were reviewed per nurses notes.  Patient is afebrile.  Pulse oximetry is 99% on room air.  HEENT: Normocephalic/atraumatic.  Anicteric sclera.  Oral mucosa is moist.  Oropharynx is normal.  Neck: No adenopathy or thyromegaly.  Lungs are clear to auscultation.  Heart exam: Normal S1-S2 without extra sounds or murmurs.  Regular rate and rhythm.  Abdomen is nontender.  Extremities are atraumatic.  Skin is dry without rashes or lesions.  Neuroexam: Awake and moving all 4 extremities well.  ED Course     Labs Reviewed   RAPID SARS-COV-2 BY PCR - Abnormal; Notable for the following components:       Result Value    Rapid SARS-CoV-2 by PCR Detected (*)     All other components within normal limits   POCT FLU TEST - Normal    Narrative:     This assay is a rapid molecular in vitro test utilizing nucleic acid amplification of influenza A and B viral RNA.   RAPID STREP A - Normal            Test results and treatment plan were discussed.       MDM      #1.  URI symptoms with positive COVID-19 swab.  Influenza negative.  She is beyond the window for antivirals.  Masking restrictions discussed for the next few days.        Medical Decision Making      Disposition and Plan     Clinical Impression:  1. COVID-19         Disposition:  Discharge  2/1/2025  2:58 pm    Follow-up:  Duyen Sherwood MD  [address] [place] [id number] [phone]    Call   As needed          Medications Prescribed:  There are no discharge medications for this patient.          Supplementary Documentation:

## 2025-03-08 ENCOUNTER — PATIENT MESSAGE (OUTPATIENT)
Dept: ENDOCRINOLOGY CLINIC | Facility: CLINIC | Age: 45
End: 2025-03-08

## 2025-03-08 DIAGNOSIS — E04.1 THYROID NODULE: Primary | ICD-10-CM

## 2025-03-08 NOTE — TELEPHONE ENCOUNTER
Dr Juárez,    Pended Thyroid IS      Per last result notes from you 3/6/24:  Good afternoon,  Overall the thyroid nodule is stable.  There is no need for another biopsy since has already been completed.  We will plan to continue to monitor nodule with repeat US in one year.  Let me know if you have any questions or concerns.  Dr. Juárez

## 2025-03-31 ENCOUNTER — HOSPITAL ENCOUNTER (OUTPATIENT)
Dept: ULTRASOUND IMAGING | Age: 45
Discharge: HOME | End: 2025-03-31
Attending: INTERNAL MEDICINE
Payer: COMMERCIAL

## 2025-03-31 DIAGNOSIS — E04.1 THYROID NODULE: ICD-10-CM

## 2025-03-31 PROCEDURE — 76536 US EXAM OF HEAD AND NECK: CPT | Performed by: INTERNAL MEDICINE

## 2025-06-19 ENCOUNTER — TELEPHONE (OUTPATIENT)
Dept: ENDOCRINOLOGY CLINIC | Facility: CLINIC | Age: 45
End: 2025-06-19

## 2025-06-19 DIAGNOSIS — E04.1 THYROID NODULE: Primary | ICD-10-CM

## 2025-06-19 NOTE — TELEPHONE ENCOUNTER
Pt is scheduled to see dr faustin/ endocrinology today. Please enter referral into epic with more follow up visits

## 2025-08-20 ENCOUNTER — TELEPHONE (OUTPATIENT)
Dept: INTERNAL MEDICINE CLINIC | Facility: CLINIC | Age: 45
End: 2025-08-20

## 2025-08-20 DIAGNOSIS — Z00.00 LABORATORY EXAM ORDERED AS PART OF ROUTINE GENERAL MEDICAL EXAMINATION: Primary | ICD-10-CM

## 2025-08-20 DIAGNOSIS — Z00.00 PHYSICAL EXAM, ROUTINE: ICD-10-CM

## 2025-08-27 ENCOUNTER — HOSPITAL ENCOUNTER (OUTPATIENT)
Age: 45
Discharge: HOME | End: 2025-08-27

## 2025-08-27 VITALS
WEIGHT: 145 LBS | TEMPERATURE: 98 F | DIASTOLIC BLOOD PRESSURE: 67 MMHG | BODY MASS INDEX: 23 KG/M2 | SYSTOLIC BLOOD PRESSURE: 115 MMHG | OXYGEN SATURATION: 100 % | HEART RATE: 85 BPM | RESPIRATION RATE: 17 BRPM

## 2025-08-27 DIAGNOSIS — J02.9 VIRAL PHARYNGITIS: Primary | ICD-10-CM

## 2025-08-27 LAB
S PYO AG THROAT QL IA.RAPID: NEGATIVE
SARS-COV-2 RNA RESP QL NAA+PROBE: NOT DETECTED

## 2025-08-27 PROCEDURE — 99213 OFFICE O/P EST LOW 20 MIN: CPT

## 2025-08-27 PROCEDURE — 87651 STREP A DNA AMP PROBE: CPT | Performed by: NURSE PRACTITIONER

## 2025-08-27 RX ORDER — DEXAMETHASONE 4 MG/1
8 TABLET ORAL ONCE
Status: COMPLETED | OUTPATIENT
Start: 2025-08-27 | End: 2025-08-27

## (undated) NOTE — ED AVS SNAPSHOT
Jessica Pruitt   MRN: [id number]    Department:  [facility] Emergency Department   Date of Visit:  6/1/2018           Disclosure     Insurance plans vary and the physician(s) referred by the ER may not be covered by your plan.  Please contact CARE PHYSICIAN AT ONCE OR RETURN IMMEDIATELY TO THE EMERGENCY DEPARTMENT. If you have been prescribed any medication(s), please fill your prescription right away and begin taking the medication(s) as directed.   If you believe that any of the medications

## (undated) NOTE — LETTER
AUTHORIZATION FOR SURGICAL OPERATION OR OTHER PROCEDURE    1.  I hereby authorize Dr. Trudi Roberts, [facility] assigned to my case to perform the following operation and/or procedure at the [facility]:    _______________________ Time:  ________ A. M.  P.M.        Patient Name:  ______________________________________________________  (please print)      Patient signature:  ___________________________________________________             Relationship to Patient:

## (undated) NOTE — LETTER
Date & Time: 10/14/2021, 6:01 PM  Patient: Caroline Meyer  Encounter Provider(s):    ANABEL Perry       To Whom It May Concern:    Sal Meyer was seen and treated in our department on 10/14/2021.  She should not return to work until 10/18/2

## (undated) NOTE — LETTER
Patient: Bard Pagan   YOB: 1980   Date of Visit: 10/20/2021     Dear  Dr. Arline Yoder MD,    Thank you for referring Sal Meyer to my practice. Please find my assessment and plan below.       Internal hemorrhoids with complication

## (undated) NOTE — Clinical Note
Please contact patient to set up right hemithyroidectomy 1 hour with Dr. Afsaneh Florez to assist sometime in late May or June when she is done with work.   This to be performed at [facility]

## (undated) NOTE — LETTER
Patient Name: Cari Lott  : 1980  MRN: [id number]  Patient Address: [address] [place] [address] Dr Wilson [place] Disease 2019 (COVID-19)     [facility] is committed to the safety and well-being of our patients, m carefully. If your symptoms get worse, call your healthcare provider immediately. 3. Get rest and stay hydrated.    4. If you have a medical appointment, call the healthcare provider ahead of time and tell them that you have or may have COVID-19.  5. For m of fever-reducing medications; and  · Improvement in respiratory symptoms (e.g., cough, shortness of breath); and  · At least 10 days have passed since symptoms first appeared OR if asymptomatic patient or date of symptom onset is unclear then use 10 days donors must:    · Have had a confirmed diagnosis of COVID-19  · Be symptom-free for at least 14 days*    *Some people will be required to have a repeat COVID-19 test in order to be eligible to donate.  If you’re instructed by Nicolle that a repeat test is r random. Researchers are trying to identify similarities between people with a Post-COVID condition to better understand if there are risk factors. How do I prevent a Post-COVID condition?   The best way to prevent the long-term symptoms of COVID-19 is

## (undated) NOTE — LETTER
[address] [place] [address]  Authorization for Surgical Operation or Procedure    1.  I hereby authorize Dr. Melva Ogden / Natalia Johnson / Gretchen Che , my physician and the assistant, to perform the following operation and/or procedure:  ULTRASOUND GUIDE procedures to be performed for the purposes of advancing medicine, science, and/or education, provided my identity is not revealed. If the procedure has been videotaped, the physician/surgeon will obtain the original videotape.  The hospital will not be res to the proposed treatment. I have also explained the risks and benefits involved in the refusal of the proposed treatment and have answered the patient's questions.  If I have a significant financial interest in a co-management agreement or a significant fi

## (undated) NOTE — MR AVS SNAPSHOT
[facility]  [address] [place] [address], [address] [place] [address]               Thank you for choosing us for your health care visit with Riddhi Collins MD.  We are glad to serve you and happy to provide you with this summary of yo Imaging:  CT ABDOMEN + PELVIS(CONTRAST ONLY) (LBL=41320)    Instructions:   To schedule a test at any [facility], call Central Scheduling at [phone], Monday through Friday between 7:30am to 6pm and on Saturday between 8am Take 1 tablet by mouth daily. MyChart     Sign up for [employer], your secure online medical record. [employer] will allow you to access patient instructions from your recent visit,  view other health information, and more.  To sign up or find

## (undated) NOTE — ED AVS SNAPSHOT
Jose J Linton   MRN: [id number]    Department:  [facility] Emergency Department   Date of Visit:  1/1/2018           Disclosure     Insurance plans vary and the physician(s) referred by the ER may not be covered by your plan.  Please contact CARE PHYSICIAN AT ONCE OR RETURN IMMEDIATELY TO THE EMERGENCY DEPARTMENT. If you have been prescribed any medication(s), please fill your prescription right away and begin taking the medication(s) as directed.   If you believe that any of the medications

## (undated) NOTE — LETTER
[facility]  [address] [facility] [address]  Dept: [phone]  Dept Fax: [phone]         February 1, 2025    Patient: Sal Meyer   YOB: 1980   Date of Visit: 2/1/2025       To Whom It May Concern:    Sal Meyer was seen and treated in our [facility] department on 2/1/2025. She may return to work on Monday, February 3, 2025.  Strict mask wearing through and including Tuesday, February 4, 2025 .    If you have any questions or concerns, please don't hesitate to call.      Encounter Provider(s):    Melissa Pierce MD     2:58 PM  2/1/2025

## (undated) NOTE — LETTER
Brendan Lazo, [address]  [facility]  [address],  [address]       02/25/21        Patient: Bri Meyer   YOB: 1980   Date of Visit: 2/25/2021       Dear  Dr. Lucio Pleitez MD,      Thank you for referring Sal Meyer to my practice.

## (undated) NOTE — LETTER
July 7, 2017     [facility]   [address]  [address]      Dear Kaitlynn Paez:    Below are the results from your recent visit:    kendall Rodriguez is normal  Continue  suppliment as she doing  Now  indefinetely     Resulted Orders   C PT 12.5 11.8 - 14.5 seconds    INR 1.0 0.9 - 1.2   PTT, ACTIVATED   Result Value Ref Range    PTT 31.2 23.2 - 35.3 seconds   CBC W/ DIFFERENTIAL   Result Value Ref Range    WBC 5.9 4.0 - 11.0 K/UL    RBC 4.57 3.70 - 5.40 M/UL    HGB 13.1 12.0 - 16.0 g/dL

## (undated) NOTE — LETTER
11/24/2021              Amal R Abdellatif        [address] [facility]         Dear Kandis Echeverria records indicate that the FNA thyroid ultrasound and the thyroid labs ordered for you by Rasheeda Moura MD have not been

## (undated) NOTE — LETTER
[address], [place]  Authorization for Surgical Operation or Procedure  1.  I hereby authorize  ____ , my physician and the assistant, to perform the following operation and/or procedure:  [facility] to be performed for the purposes of advancing medicine, science, and/or education, provided my identity is not revealed. If the procedure has been videotaped, the physician/surgeon will obtain the original videotape.  The hospital will not be responsible fo proposed treatment. I have also explained the risks and benefits involved in the refusal of the proposed treatment and have answered the patient's questions.  If I have a significant financial interest in a co-management agreement or a significant financial

## (undated) NOTE — ED AVS SNAPSHOT
yTrone Parmar   MRN: [id number]    Department:  [facility] Emergency Department   Date of Visit:  2/24/2019           Disclosure     Insurance plans vary and the physician(s) referred by the ER may not be covered by your plan.  Please contac CARE PHYSICIAN AT ONCE OR RETURN IMMEDIATELY TO THE EMERGENCY DEPARTMENT. If you have been prescribed any medication(s), please fill your prescription right away and begin taking the medication(s) as directed.   If you believe that any of the medications

## (undated) NOTE — ED AVS SNAPSHOT
Cait Hoyos   MRN: [id number]    Department:  [facility] Emergency Department   Date of Visit:  10/7/2017           Disclosure     Insurance plans vary and the physician(s) referred by the ER may not be covered by your plan.  Please contac CARE PHYSICIAN AT ONCE OR RETURN IMMEDIATELY TO THE EMERGENCY DEPARTMENT. If you have been prescribed any medication(s), please fill your prescription right away and begin taking the medication(s) as directed.   If you believe that any of the medications